# Patient Record
Sex: MALE | Race: WHITE | NOT HISPANIC OR LATINO | Employment: FULL TIME | URBAN - METROPOLITAN AREA
[De-identification: names, ages, dates, MRNs, and addresses within clinical notes are randomized per-mention and may not be internally consistent; named-entity substitution may affect disease eponyms.]

---

## 2021-11-04 ENCOUNTER — APPOINTMENT (OUTPATIENT)
Dept: URGENT CARE | Facility: CLINIC | Age: 56
End: 2021-11-04

## 2021-11-04 DIAGNOSIS — Z00.8 ENCOUNTER FOR BIOMETRIC SCREENING: ICD-10-CM

## 2021-11-04 LAB
ANION GAP SERPL CALCULATED.3IONS-SCNC: 1 MMOL/L (ref 4–13)
BUN SERPL-MCNC: 19 MG/DL (ref 5–25)
CALCIUM SERPL-MCNC: 9.1 MG/DL (ref 8.3–10.1)
CHLORIDE SERPL-SCNC: 110 MMOL/L (ref 100–108)
CHOLEST SERPL-MCNC: 209 MG/DL (ref 50–200)
CO2 SERPL-SCNC: 29 MMOL/L (ref 21–32)
CREAT SERPL-MCNC: 0.78 MG/DL (ref 0.6–1.3)
GFR SERPL CREATININE-BSD FRML MDRD: 102 ML/MIN/1.73SQ M
GLUCOSE P FAST SERPL-MCNC: 102 MG/DL (ref 65–99)
HDLC SERPL-MCNC: 53 MG/DL
LDLC SERPL CALC-MCNC: 139 MG/DL (ref 0–100)
NONHDLC SERPL-MCNC: 156 MG/DL
POTASSIUM SERPL-SCNC: 4.2 MMOL/L (ref 3.5–5.3)
SODIUM SERPL-SCNC: 140 MMOL/L (ref 136–145)
TRIGL SERPL-MCNC: 85 MG/DL

## 2021-11-04 PROCEDURE — 80061 LIPID PANEL: CPT

## 2021-11-04 PROCEDURE — 80048 BASIC METABOLIC PNL TOTAL CA: CPT

## 2024-05-15 ENCOUNTER — APPOINTMENT (OUTPATIENT)
Dept: URGENT CARE | Facility: CLINIC | Age: 59
End: 2024-05-15
Payer: OTHER MISCELLANEOUS

## 2024-05-15 PROCEDURE — 99283 EMERGENCY DEPT VISIT LOW MDM: CPT | Performed by: NURSE PRACTITIONER

## 2024-05-15 PROCEDURE — G0382 LEV 3 HOSP TYPE B ED VISIT: HCPCS | Performed by: NURSE PRACTITIONER

## 2024-05-15 RX ORDER — DOXAZOSIN MESYLATE 1 MG/1
1 TABLET ORAL
COMMUNITY
Start: 2024-01-17 | End: 2025-01-16

## 2024-05-15 RX ORDER — AMLODIPINE AND VALSARTAN 10; 320 MG/1; MG/1
1 TABLET ORAL DAILY
COMMUNITY
Start: 2024-01-17 | End: 2025-01-16

## 2024-05-15 RX ORDER — METOPROLOL TARTRATE 50 MG/1
50 TABLET, FILM COATED ORAL 2 TIMES DAILY
COMMUNITY
Start: 2024-01-17 | End: 2025-01-16

## 2024-05-30 ENCOUNTER — APPOINTMENT (OUTPATIENT)
Dept: URGENT CARE | Facility: CLINIC | Age: 59
End: 2024-05-30
Payer: OTHER MISCELLANEOUS

## 2024-05-30 PROCEDURE — 99213 OFFICE O/P EST LOW 20 MIN: CPT | Performed by: NURSE PRACTITIONER

## 2024-05-31 ENCOUNTER — OFFICE VISIT (OUTPATIENT)
Dept: OBGYN CLINIC | Facility: CLINIC | Age: 59
End: 2024-05-31
Payer: OTHER MISCELLANEOUS

## 2024-05-31 VITALS
HEART RATE: 66 BPM | DIASTOLIC BLOOD PRESSURE: 105 MMHG | HEIGHT: 68 IN | BODY MASS INDEX: 36.37 KG/M2 | SYSTOLIC BLOOD PRESSURE: 169 MMHG | WEIGHT: 240 LBS

## 2024-05-31 DIAGNOSIS — S83.241A OTHER TEAR OF MEDIAL MENISCUS, CURRENT INJURY, RIGHT KNEE, INITIAL ENCOUNTER: Primary | ICD-10-CM

## 2024-05-31 PROCEDURE — 99204 OFFICE O/P NEW MOD 45 MIN: CPT | Performed by: ORTHOPAEDIC SURGERY

## 2024-05-31 PROCEDURE — 20610 DRAIN/INJ JOINT/BURSA W/O US: CPT | Performed by: ORTHOPAEDIC SURGERY

## 2024-05-31 RX ORDER — ASPIRIN 81 MG/1
81 TABLET, CHEWABLE ORAL DAILY
COMMUNITY

## 2024-05-31 RX ORDER — HYDROCHLOROTHIAZIDE 25 MG/1
25 TABLET ORAL DAILY
COMMUNITY

## 2024-05-31 RX ORDER — VALSARTAN 40 MG/1
40 TABLET ORAL DAILY
COMMUNITY

## 2024-05-31 RX ADMIN — BUPIVACAINE HYDROCHLORIDE 4 ML: 5 INJECTION, SOLUTION EPIDURAL; INTRACAUDAL at 09:15

## 2024-05-31 RX ADMIN — KETOROLAC TROMETHAMINE 30 MG: 30 INJECTION, SOLUTION INTRAMUSCULAR; INTRAVENOUS at 09:15

## 2024-05-31 NOTE — LETTER
Caroline 3, 2024     Patient: Oumar Dow  YOB: 1965  Date of Visit: 5/31/2024      To Whom it May Concern:    Oumar Dow is under my professional care. Oumar was seen in my office on 5/31/2024. Oumar  is cleared for sedentary work only at this time. He will be seen again in 2 weeks for repeat evaluation.     If you have any questions or concerns, please don't hesitate to call.         Sincerely,          Kenneth Amaya MD

## 2024-06-03 RX ORDER — KETOROLAC TROMETHAMINE 30 MG/ML
30 INJECTION, SOLUTION INTRAMUSCULAR; INTRAVENOUS
Status: COMPLETED | OUTPATIENT
Start: 2024-05-31 | End: 2024-05-31

## 2024-06-03 RX ORDER — BUPIVACAINE HYDROCHLORIDE 5 MG/ML
4 INJECTION, SOLUTION EPIDURAL; INTRACAUDAL
Status: COMPLETED | OUTPATIENT
Start: 2024-05-31 | End: 2024-05-31

## 2024-06-03 NOTE — PROGRESS NOTES
Ortho Sports Medicine New Patient Visit     Assesment:   58 y.o. male with right acute medial meniscus tear, hypertension    Plan:    A long discussion with Oumar regarding his knee and treatment plan.  We reviewed his MRI together which does show an acute medial meniscus tear.  I do believe this is the main contributing factor to his significant pain.  We did discuss treatment for this including operative and nonoperative options.  At this point were going to start a course of nonoperative treatment.  If he is not able to see significant improvements with an injection today and physical therapy then we may consider right knee arthroscopy with partial medial meniscectomy in the future.  I did recommend that he see his primary care provider to continue to improve his control of his hypertension prior to consider any surgery.  Did perform an injection today with Toradol which was well-tolerated.  He will remain on sedentary work only at this time.  See him back in 4 weeks for repeat evaluation.        Follow up:    No follow-ups on file.        Chief Complaint   Patient presents with    Right Knee - Pain, Swelling       History of Present Illness:    The patient is a 58 y.o. male presenting with several weeks after an acute twisting injury that occurred at work.  He was sent for an MRI which does show a medial meniscus tear as well as minimal degenerative changes.  He continues to have significant pain limiting his ability to be active and continue his work duties.  Pain is all localized to the medial aspect of the joint.  He gets occasional locking sensation and swelling.  He has been using NSAIDs with minimal relief.  He does also get repetitive swelling.  He denies any significant numbness or tingling.      Knee Surgical History:  None    Past Medical, Social and Family History:  Past Medical History:   Diagnosis Date    High blood pressure      History reviewed. No pertinent surgical history.  Allergies   Allergen  Reactions    Lisinopril Hypertension and Other (See Comments)     Current Outpatient Medications on File Prior to Visit   Medication Sig Dispense Refill    amLODIPine-valsartan (EXFORGE)  MG per tablet Take 1 tablet by mouth daily      aspirin 81 mg chewable tablet Chew 81 mg daily      doxazosin (CARDURA) 1 mg tablet Take 1 mg by mouth      hydroCHLOROthiazide 25 mg tablet Take 25 mg by mouth daily      metoprolol tartrate (LOPRESSOR) 50 mg tablet Take 50 mg by mouth 2 (two) times a day      valsartan (DIOVAN) 40 mg tablet Take 40 mg by mouth daily       No current facility-administered medications on file prior to visit.     Social History     Socioeconomic History    Marital status: Unknown     Spouse name: Not on file    Number of children: Not on file    Years of education: Not on file    Highest education level: Not on file   Occupational History    Not on file   Tobacco Use    Smoking status: Never    Smokeless tobacco: Never   Substance and Sexual Activity    Alcohol use: Not Currently    Drug use: Never    Sexual activity: Yes   Other Topics Concern    Not on file   Social History Narrative    Not on file     Social Determinants of Health     Financial Resource Strain: Not on file   Food Insecurity: Not on file   Transportation Needs: Not on file   Physical Activity: Not on file   Stress: Not on file   Social Connections: Not on file   Intimate Partner Violence: Not on file   Housing Stability: Not on file         I have reviewed the past medical, surgical, social and family history, medications and allergies as documented in the EMR.    Review of systems: ROS is negative other than that noted in the HPI.  Constitutional: Negative for fatigue and fever.   HENT: Negative for sore throat.    Respiratory: Negative for shortness of breath.    Cardiovascular: Negative for chest pain.   Gastrointestinal: Negative for abdominal pain.   Endocrine: Negative for cold intolerance and heat intolerance.  "  Genitourinary: Negative for flank pain.   Musculoskeletal: Negative for back pain.   Skin: Negative for rash.   Allergic/Immunologic: Negative for immunocompromised state.   Neurological: Negative for dizziness.   Psychiatric/Behavioral: Negative for agitation.      Physical Exam:    Blood pressure (!) 169/105, pulse 66, height 5' 8\" (1.727 m), weight 109 kg (240 lb).    General/Constitutional: NAD, well developed, well nourished  HENT: Normocephalic, atraumatic  CV: Intact distal pulses, regular rate  Resp: No respiratory distress or labored breathing  Abdomen: soft, nondistended   Lymphatic: No lymphadenopathy palpated  Neuro: Alert and Oriented x 3, no focal deficits  Psych: Normal mood, normal affect  Skin: Warm, dry, no rashes, no erythema      Knee Exam:   No significant skin lesions or deformity  Range of motion from several degrees short of full extension to 125  Large degree of medial joint line tenderness   Painful Maris  Knee is stable to varus stress, valgus stress, Lachman, and posterior drawer.    EHL/FHL/TA/GS  SILT  Palpable DP pulse    Knee Imaging    X-rays of the knee reviewed and interpreted today. These show acute fractures, minimal degenerative changes.    MRI of the knee reviewed and interpreted today showing minimal degenerative changes, medial meniscus tear, no significant ligamentous injury.    Large joint arthrocentesis: R knee  Universal Protocol:  Consent: Verbal consent obtained.  Risks and benefits: risks, benefits and alternatives were discussed  Consent given by: patient  Time out: Immediately prior to procedure a \"time out\" was called to verify the correct patient, procedure, equipment, support staff and site/side marked as required.  Patient identity confirmed: verbally with patient  Supporting Documentation  Indications: pain   Procedure Details  Location: knee - R knee  Needle size: 22 G  Ultrasound guidance: no  Approach: superior  Medications administered: 4 mL bupivacaine " (PF) 0.5 %; 30 mg ketorolac 30 mg/mL    Patient tolerance: patient tolerated the procedure well with no immediate complications  Dressing:  Sterile dressing applied

## 2024-06-14 ENCOUNTER — OFFICE VISIT (OUTPATIENT)
Dept: OBGYN CLINIC | Facility: CLINIC | Age: 59
End: 2024-06-14

## 2024-06-14 VITALS
DIASTOLIC BLOOD PRESSURE: 92 MMHG | HEART RATE: 83 BPM | SYSTOLIC BLOOD PRESSURE: 162 MMHG | BODY MASS INDEX: 36.37 KG/M2 | WEIGHT: 240 LBS | HEIGHT: 68 IN

## 2024-06-14 DIAGNOSIS — I10 HYPERTENSION, UNSPECIFIED TYPE: ICD-10-CM

## 2024-06-14 DIAGNOSIS — S83.241D OTHER TEAR OF MEDIAL MENISCUS, CURRENT INJURY, RIGHT KNEE, SUBSEQUENT ENCOUNTER: Primary | ICD-10-CM

## 2024-06-14 PROCEDURE — 99213 OFFICE O/P EST LOW 20 MIN: CPT | Performed by: ORTHOPAEDIC SURGERY

## 2024-06-14 NOTE — PROGRESS NOTES
Assessment/Plan:  1. Other tear of medial meniscus, current injury, right knee, subsequent encounter        2. Hypertension, unspecified type          Scribe Attestation      I,:  John Rupert am acting as a scribe while in the presence of the attending physician.:       I,:  Kenneth Amaya MD personally performed the services described in this documentation    as scribed in my presence.:               Oumar is continuing to struggle with left knee pain due to his medial meniscus tear.  I do feel that he is an operative candidate once his hypertension is under control.  His next visit with his primary care physician is July 18, 2024.  I did offer an order for a medial  brace to help decrease his medial knee pain.  He will be fitted today.  This will likely require authorization from Workmen's Compensation.  I would like him to begin physical therapy of which he is amenable to.  He does have an upcoming trip on July 7.  I would like him to return the first week of July and we will discuss providing him a cortisone injection to decrease the inflammation and pain.  If he chooses to have a steroid injection he will not be eligible for surgery for 6 weeks from the date of the injection.    Subjective:   Oumar Dow is a 58 y.o. male who presents 4-week follow-up evaluation of the right knee with an acute medial meniscus tear.  This is a Workmen's Compensation injury.  Oumar states his knee pain remains fairly unchanged.  He continues with a complaint of medial knee pain that is exacerbated with bearing weight on the right lower extremity.  Flexing the knee can be quite painful as well.  He denies any recent episodes of instability, catching, locking or buckling.  Prolonged sitting in the car can be quite painful for him.  Oumar did see his primary care physician for hypertension.  They are adjusting his medications.  His primary care physician is not clearing him for surgery at this point.      Review of  Systems   Constitutional:  Positive for activity change. Negative for chills, fever and unexpected weight change.   HENT:  Negative for hearing loss, nosebleeds and sore throat.    Eyes:  Negative for pain, redness and visual disturbance.   Respiratory:  Negative for cough, shortness of breath and wheezing.    Cardiovascular:  Negative for chest pain, palpitations and leg swelling.   Gastrointestinal:  Negative for abdominal pain, nausea and vomiting.   Endocrine: Negative for polyphagia and polyuria.   Genitourinary:  Negative for dysuria and hematuria.   Musculoskeletal:  Positive for arthralgias, gait problem and myalgias.        See HPI   Skin:  Negative for rash and wound.   Neurological:  Negative for dizziness, numbness and headaches.   Psychiatric/Behavioral:  Negative for decreased concentration and suicidal ideas. The patient is not nervous/anxious.          Past Medical History:   Diagnosis Date    High blood pressure        History reviewed. No pertinent surgical history.    Family History   Problem Relation Age of Onset    No Known Problems Mother     No Known Problems Father     No Known Problems Sister     No Known Problems Brother        Social History     Occupational History    Not on file   Tobacco Use    Smoking status: Never    Smokeless tobacco: Never   Substance and Sexual Activity    Alcohol use: Not Currently    Drug use: Never    Sexual activity: Yes         Current Outpatient Medications:     amLODIPine-valsartan (EXFORGE)  MG per tablet, Take 1 tablet by mouth daily, Disp: , Rfl:     aspirin 81 mg chewable tablet, Chew 81 mg daily, Disp: , Rfl:     doxazosin (CARDURA) 1 mg tablet, Take 1 mg by mouth, Disp: , Rfl:     hydroCHLOROthiazide 25 mg tablet, Take 25 mg by mouth daily, Disp: , Rfl:     metoprolol tartrate (LOPRESSOR) 50 mg tablet, Take 50 mg by mouth 2 (two) times a day, Disp: , Rfl:     valsartan (DIOVAN) 40 mg tablet, Take 40 mg by mouth daily, Disp: , Rfl:     Allergies    Allergen Reactions    Lisinopril Hypertension and Other (See Comments)       Objective:  Vitals:    06/14/24 1000   BP: 162/92   Pulse: 83       Ortho Exam  Knee Exam:   No significant skin lesions or deformity  Range of motion from several degrees short of full extension to 125  Large degree of medial joint line tenderness   Painful Maris  Knee is stable to varus stress, valgus stress, Lachman, and posterior drawer.    EHL/FHL/TA/GS  SILT    Physical Exam  Vitals reviewed.   Constitutional:       Appearance: He is well-developed.   HENT:      Head: Normocephalic and atraumatic.   Eyes:      General:         Right eye: No discharge.         Left eye: No discharge.      Conjunctiva/sclera: Conjunctivae normal.   Cardiovascular:      Rate and Rhythm: Regular rhythm.   Pulmonary:      Effort: Pulmonary effort is normal. No respiratory distress.   Musculoskeletal:      Cervical back: Normal range of motion and neck supple.      Comments: As noted in the HPI.   Skin:     General: Skin is warm and dry.   Neurological:      Mental Status: He is alert and oriented to person, place, and time.   Psychiatric:         Behavior: Behavior normal.               This document was created using speech voice recognition software.   Grammatical errors, random word insertions, pronoun errors, and incomplete sentences are an occasional consequence of this system due to software limitations, ambient noise, and hardware issues.   Any formal questions or concerns about content, text, or information contained within the body of this dictation should be directly addressed to the provider for clarification.

## 2024-06-18 ENCOUNTER — TELEPHONE (OUTPATIENT)
Age: 59
End: 2024-06-18

## 2024-06-18 NOTE — TELEPHONE ENCOUNTER
Caller: Stephen    Doctor/Office: Jose Luis MAO#: [phone]      What needs to be faxed: OVN 6/14  Can WC also get an updated work status and fax it as well  ATTN to: claim number [id number]    Fax#: [phone]      Documents were successfully e-faxed

## 2024-06-21 NOTE — TELEPHONE ENCOUNTER
Caller: [employer]-    Doctor/Office: Jose Luis    CB#:        What needs to be faxed: OVN for 6/14/24    ATTN to: [employer] Claim# [id number]     Fax#: [phone]      Documents were successfully e-faxed---on 6/21/24

## 2024-06-25 NOTE — PROGRESS NOTES
PT Evaluation   Today's date: 2024  Patient name: Oumar Dow  : 1965  MRN: [id number]  Referring provider: Kenneth Amaya*  Dx:   Encounter Diagnosis     ICD-10-CM    1. Tear of medial meniscus of right knee, current, unspecified tear type, subsequent encounter  S83.241D Ambulatory Referral to Physical Therapy            Assessment  Assessment details: Patient is a 58 y.o. Male who presents with referring diagnosis of meniscus tear of R knee. Patient's greatest concern is the pain he is experiencing and fear of not being able to keep active.    Primary movement impairment diagnosis of hypomobility resulting in pathoanatomical symptoms of pain, decreased ROM, proprioception, power production, and function. The aforementioned impairments have limited the patient's ability to transfer, mobilize, tolerate activity, prolonged positions, and perform ADLs. No further referral appears necessary at this time based upon examination results    Patient education performed during today's session included: POC, prognosis    Primary movement impairments:  1) Hypomobility      Impairments: Abnormal or restricted ROM, Activity intolerance, Impaired physical strength, Lacks appropriate HEP, Poor posture, Poor body mechanics, and Pain with function  Understanding of Dx/Px/POC: Good  Prognosis: Good    Patient verbalized understanding of POC.         Please contact me if you have any questions or recommendations. Thank you for the referral and the opportunity to share in Oumar Dow's care.        Plan  Patient would benefit from: Skilled PT  Planned modality interventions: Biofeedback  Planned therapy interventions: Abdominal trunk stabilization, Balance/WB training, Body mechanics training, Dry Needling, Functional ROM exercises, HEP, Joint mobilization, Manual therapy, Motor coordination training, Neuromuscular re-education, Patient  education, Postural training, Strengthening, Stretching, Therapeutic activities, and Therapeutic exercises  Frequency: 2x/week  Duration in weeks: 6-8 weeks  Plan of Care beginning date: 24  Plan of Care expiration date: 8 weeks - 2024  Treatment plan discussed with: Patient       Goals  Short Term Goals (1-3 weeks):    - Patient will be independent in basic HEP 2-3 weeks  - Patient will report >50% reduction in pain  - Patient will demonstrate >1/3 improvement in MMT grade as applicable  - Patient will be able to ambulate w/o greater than 4/10 pain w/ LRDN    Long Term Goals (6-8 weeks):  - Patient will be independent in a comprehensive home exercise program  - Patient FOTO score will improve to beyond goal score  - Patient will self-report >80% improvement in function  - Patient will be able to return to ADLs w/o limitation  - Pt will be able to return to work w/o limitations      Subjective    History of Present Illness  - Mechanism of injury: Pt presents w/ primary complaints of R knee that occurred on April 15, 2025. Pt was turning when he was talking to a customer and he felt a pop. He states that when he drove home, his knee locked up. He filed about his injury but his complaint was mishandled and lost for approx. 5 weeks. He is having significant difficulty w/ prolonged positions, transferring, and stairs. He reports minimal difficulty w/ walking and standing. He reports significant popping, locking, clicking, and buckling. Walking helps his pain. Pt is experiencing significant R knee pain as well after favoring his L side. Current presents w/ a knee brace from his doctor.   - Primary AD: n/a  - Assist level at home: Independent  - Prior level of function: Independent      Pain  - Current pain ratin/10  - At best pain ratin/10  - At worst pain rating: 10/10  - Location: Medial aspect of knee  - Aggravating factors: Driving, sitting, transferring,     Objective     Red Flag Screening  -  Positive for: age >50 years old    - Negative for: history of cancer, fever, chills, night sweats, weight loss, recent infection, immunosuppression, rest/night pain, saddle anesthesia, bladder dysfunction, and LE neurological deficits      Sensation  - Light touch: Intact and symmetrical L1-S2    LE MMT  LEFT   RIGHT  -Hip Flexion:   3+/5 P!  3+/5 P!    -Knee Flexion  4-/5 P!  4-/5 P!  -Knee Extension 4-/5 P!  4-/5 P!    -Ankle DF  5/5  5/5  -Ankle PF  5/5  5/5        Hip Range of Motion    LEFT  RIGHT  - Flexion WNL  WNL    Knee Range of Motion    LEFT  RIGHT  - Flexion WNL  WNL  - Extension WNL  WNL     Ankle Range of Motion    LEFT  RIGHT  - DF  WNL  WNL  - PF  WNL  WNL      Diagnostic Tests Performed  LEFT  Positive bounce home test b/l    Functional Assessment  -Functional Squat: Valgus  -Gait Assessment: Antalgic gait, decreased stride length                 Insurance:  A/CMS Eval/ Re-eval POC expires FOTO Auth #/ Referral # Total units  Start date  Expiration date Extension  Visit limitation?  PT only or  PT+OT? Co-Insurance   AMA 6/27/24 8/22/24  No auth req                                                                         Date               Units:  Used               Authed:  Remaining                     Date               Units:  Used               Authed:  Remaining                      Date 6/27/24        Visit Number IE        Manual                                             Neuro Re-Ed         Bridges 1x10        Squat 1x10                                                                                TherEx                                                                        TherAct         Patient education                                             Gait Training                                    Modalities         CP               Precautions: HBP  Past Medical History:   Diagnosis Date    High blood pressure

## 2024-06-27 ENCOUNTER — EVALUATION (OUTPATIENT)
Dept: PHYSICAL THERAPY | Facility: CLINIC | Age: 59
End: 2024-06-27
Payer: OTHER MISCELLANEOUS

## 2024-06-27 DIAGNOSIS — S83.241D TEAR OF MEDIAL MENISCUS OF RIGHT KNEE, CURRENT, UNSPECIFIED TEAR TYPE, SUBSEQUENT ENCOUNTER: ICD-10-CM

## 2024-06-27 PROCEDURE — 97161 PT EVAL LOW COMPLEX 20 MIN: CPT

## 2024-06-27 PROCEDURE — 97112 NEUROMUSCULAR REEDUCATION: CPT

## 2024-06-28 ENCOUNTER — OFFICE VISIT (OUTPATIENT)
Dept: OBGYN CLINIC | Facility: CLINIC | Age: 59
End: 2024-06-28

## 2024-06-28 VITALS
WEIGHT: 240 LBS | SYSTOLIC BLOOD PRESSURE: 148 MMHG | BODY MASS INDEX: 36.37 KG/M2 | HEIGHT: 68 IN | HEART RATE: 76 BPM | DIASTOLIC BLOOD PRESSURE: 90 MMHG

## 2024-06-28 DIAGNOSIS — S83.241D OTHER TEAR OF MEDIAL MENISCUS, CURRENT INJURY, RIGHT KNEE, SUBSEQUENT ENCOUNTER: Primary | ICD-10-CM

## 2024-06-28 PROCEDURE — 99213 OFFICE O/P EST LOW 20 MIN: CPT | Performed by: ORTHOPAEDIC SURGERY

## 2024-06-28 PROCEDURE — 20610 DRAIN/INJ JOINT/BURSA W/O US: CPT | Performed by: ORTHOPAEDIC SURGERY

## 2024-06-28 RX ORDER — TRIAMCINOLONE ACETONIDE 40 MG/ML
40 INJECTION, SUSPENSION INTRA-ARTICULAR; INTRAMUSCULAR
Status: COMPLETED | OUTPATIENT
Start: 2024-06-28 | End: 2024-06-28

## 2024-06-28 RX ORDER — BUPIVACAINE HYDROCHLORIDE 5 MG/ML
4 INJECTION, SOLUTION EPIDURAL; INTRACAUDAL
Status: COMPLETED | OUTPATIENT
Start: 2024-06-28 | End: 2024-06-28

## 2024-06-28 RX ADMIN — BUPIVACAINE HYDROCHLORIDE 4 ML: 5 INJECTION, SOLUTION EPIDURAL; INTRACAUDAL at 08:15

## 2024-06-28 RX ADMIN — TRIAMCINOLONE ACETONIDE 40 MG: 40 INJECTION, SUSPENSION INTRA-ARTICULAR; INTRAMUSCULAR at 08:15

## 2024-06-28 NOTE — TELEPHONE ENCOUNTER
Caller: [employer]    Doctor: Jose Luis    Reason for call: Request work status note. Faxed to [phone] claim#[id number]    Call back#: [phone]

## 2024-06-28 NOTE — LETTER
June 28, 2024     Patient: Oumar Dow  YOB: 1965  Date of Visit: 6/28/2024      To Whom it May Concern:    Oumar Dow is under my professional care. Oumar was seen in my office on 6/28/2024. Oumar is cleared for sedentary work only at this time. He will be seen back in 3-4 weeks to assess his blood pressure and determine whether he is able to undergo surgery at that time vs return to work if the injection provides adequate relief.     If you have any questions or concerns, please don't hesitate to call.         Sincerely,          Kenneth Amaya MD

## 2024-06-28 NOTE — PROGRESS NOTES
"Assessment/Plan:  1. Other tear of medial meniscus, current injury, right knee, subsequent encounter            Since last visit the medial  brace has improved her symptoms.  However he continues to have significant pain along the medial joint line.  This is limiting his ability to stand for any prolonged periods.  He gets significant pain whenever he is ambulating or changing directions.  He does have occasional locking as well.  We still did discuss that surgical intervention may be his best option at this point.  However his recently on new medications for his hypertension.  It is better controlled today which is a good sign.  He is can to follow-up with his primary care provider in several weeks for repeat evaluation and discussion of that issue.  If it is deemed to be well-controlled at that time on the new medication he may be a surgical candidate and I believe this would provide him more lasting relief.  In the meantime I did recommend a corticosteroid injection today.  This was performed and well-tolerated.  I explained that if this provide significant long-lasting relief he may not need surgery however I cannot predict how long we will last we will have to reevaluate at next visit.  He is can to follow-up with me shortly after the visit with his primary care doctor to continue to discuss this.  In the meantime I recommended continued light duty or sedentary work only with his job.  I provided a note for this today.    Large joint arthrocentesis: R knee  Universal Protocol:  Consent: Verbal consent obtained.  Risks and benefits: risks, benefits and alternatives were discussed  Consent given by: patient  Time out: Immediately prior to procedure a \"time out\" was called to verify the correct patient, procedure, equipment, support staff and site/side marked as required.  Patient identity confirmed: verbally with patient  Supporting Documentation  Indications: pain   Procedure Details  Location: knee - R " knee  Needle size: 22 G  Ultrasound guidance: no  Approach: superior  Medications administered: 4 mL bupivacaine (PF) 0.5 %; 40 mg triamcinolone acetonide 40 mg/mL    Patient tolerance: patient tolerated the procedure well with no immediate complications  Dressing:  Sterile dressing applied            Subjective:   Oumar Dow is a 58 y.o. male who returns for evaluation of his right knee.  Medial limb brace is helping.  Continues to have pain in locking on occasion.  It is all localized to the medial joint line where the meniscus tear is.  He is interested in a corticosteroid injection today.  Overall his blood pressure is improving with the new medication.      Review of Systems   Constitutional:  Positive for activity change. Negative for chills, fever and unexpected weight change.   HENT:  Negative for hearing loss, nosebleeds and sore throat.    Eyes:  Negative for pain, redness and visual disturbance.   Respiratory:  Negative for cough, shortness of breath and wheezing.    Cardiovascular:  Negative for chest pain, palpitations and leg swelling.   Gastrointestinal:  Negative for abdominal pain, nausea and vomiting.   Endocrine: Negative for polyphagia and polyuria.   Genitourinary:  Negative for dysuria and hematuria.   Musculoskeletal:  Positive for arthralgias, gait problem and myalgias.        See HPI   Skin:  Negative for rash and wound.   Neurological:  Negative for dizziness, numbness and headaches.   Psychiatric/Behavioral:  Negative for decreased concentration and suicidal ideas. The patient is not nervous/anxious.          Past Medical History:   Diagnosis Date    High blood pressure        History reviewed. No pertinent surgical history.    Family History   Problem Relation Age of Onset    No Known Problems Mother     No Known Problems Father     No Known Problems Sister     No Known Problems Brother        Social History     Occupational History    Not on file   Tobacco Use    Smoking status: Never     Smokeless tobacco: Never   Vaping Use    Vaping status: Never Used   Substance and Sexual Activity    Alcohol use: Not Currently    Drug use: Never    Sexual activity: Yes         Current Outpatient Medications:     amLODIPine-valsartan (EXFORGE)  MG per tablet, Take 1 tablet by mouth daily, Disp: , Rfl:     aspirin 81 mg chewable tablet, Chew 81 mg daily, Disp: , Rfl:     doxazosin (CARDURA) 1 mg tablet, Take 1 mg by mouth, Disp: , Rfl:     hydroCHLOROthiazide 25 mg tablet, Take 25 mg by mouth daily, Disp: , Rfl:     metoprolol tartrate (LOPRESSOR) 50 mg tablet, Take 50 mg by mouth 2 (two) times a day, Disp: , Rfl:     valsartan (DIOVAN) 40 mg tablet, Take 40 mg by mouth daily, Disp: , Rfl:     Allergies   Allergen Reactions    Lisinopril Hypertension and Other (See Comments)       Objective:  Vitals:    06/28/24 0822   BP: 148/90   Pulse: 76       Ortho Exam  Knee Exam:   No significant skin lesions or deformity  Range of motion from several degrees short of full extension to 125  Large degree of medial joint line tenderness   Painful Maris  Knee is stable to varus stress, valgus stress, Lachman, and posterior drawer.    EHL/FHL/TA/GS  SILT    Physical Exam  Vitals reviewed.   Constitutional:       Appearance: He is well-developed.   HENT:      Head: Normocephalic and atraumatic.   Eyes:      General:         Right eye: No discharge.         Left eye: No discharge.      Conjunctiva/sclera: Conjunctivae normal.   Cardiovascular:      Rate and Rhythm: Regular rhythm.   Pulmonary:      Effort: Pulmonary effort is normal. No respiratory distress.   Musculoskeletal:      Cervical back: Normal range of motion and neck supple.      Comments: As noted in the HPI.   Skin:     General: Skin is warm and dry.   Neurological:      Mental Status: He is alert and oriented to person, place, and time.   Psychiatric:         Behavior: Behavior normal.               This document was created using speech voice  recognition software.   Grammatical errors, random word insertions, pronoun errors, and incomplete sentences are an occasional consequence of this system due to software limitations, ambient noise, and hardware issues.   Any formal questions or concerns about content, text, or information contained within the body of this dictation should be directly addressed to the provider for clarification.

## 2024-07-02 ENCOUNTER — OFFICE VISIT (OUTPATIENT)
Dept: PHYSICAL THERAPY | Facility: CLINIC | Age: 59
End: 2024-07-02
Payer: OTHER MISCELLANEOUS

## 2024-07-02 DIAGNOSIS — S83.241D TEAR OF MEDIAL MENISCUS OF RIGHT KNEE, CURRENT, UNSPECIFIED TEAR TYPE, SUBSEQUENT ENCOUNTER: Primary | ICD-10-CM

## 2024-07-02 PROCEDURE — 97110 THERAPEUTIC EXERCISES: CPT

## 2024-07-02 PROCEDURE — 97112 NEUROMUSCULAR REEDUCATION: CPT

## 2024-07-02 PROCEDURE — 97140 MANUAL THERAPY 1/> REGIONS: CPT

## 2024-07-02 NOTE — PROGRESS NOTES
"Daily Note     Today's date: 2024  Patient name: Oumar Dow  : 1965  MRN: [id number]  Referring provider: Kenneth Amaya*  Dx:   Encounter Diagnosis     ICD-10-CM    1. Tear of medial meniscus of right knee, current, unspecified tear type, subsequent encounter  S83.241D           Start Time: 1100  Stop Time: 1145  Total time in clinic (min): 45 minutes    Subjective: Pt reports feeling okay. He hurt his hamstring over the weekend but is unsure how.       Objective: See treatment diary below      Assessment: Tolerated treatment fairly. His hamstring and knee pain have affected how much activity he can tolerate. He responded well to STM to his HS which reduced his pain. Distraction also helped reduce his knee pain. He had discomfort when squatting which did not reduce w/ cueing. Continue progressing pt as he can tolerate regarding strength, motor control, and function. Pt Patient demonstrated fatigue post treatment, exhibited good technique with therapeutic exercises, and would benefit from continued PT      Plan: Continue per plan of care.      Insurance:  AMA/CMS Eval/ Re-eval POC expires FOTO Auth #/ Referral # Total units  Start date  Expiration date Extension  Visit limitation?  PT only or  PT+OT? Co-Insurance   AMA 24  No auth req                                                                         Date               Units:  Used               Authed:  Remaining                     Date               Units:  Used               Authed:  Remaining                      Date 24       Visit Number IE 2       Manual         Tibiofemoral dist  WM       STM  HS WM                         Neuro Re-Ed         Bridges 1x10 2x12 3\" w/ belt       Squat 1x10 2x12 TRX       Clamshells  RTB 2x10 3\"       Lateral step down         Lateral slides  15' 3 laps RTB                                                    TherEx         Anterior step down  6\" 2x10       LAQ  2x10 ea     "                                                TherAct         Patient education                                             Gait Training                                    Modalities         CP               Precautions: HBP  Past Medical History:   Diagnosis Date    High blood pressure

## 2024-07-05 ENCOUNTER — OFFICE VISIT (OUTPATIENT)
Dept: PHYSICAL THERAPY | Facility: CLINIC | Age: 59
End: 2024-07-05
Payer: OTHER MISCELLANEOUS

## 2024-07-05 DIAGNOSIS — S83.241D TEAR OF MEDIAL MENISCUS OF RIGHT KNEE, CURRENT, UNSPECIFIED TEAR TYPE, SUBSEQUENT ENCOUNTER: Primary | ICD-10-CM

## 2024-07-05 PROCEDURE — 97112 NEUROMUSCULAR REEDUCATION: CPT

## 2024-07-05 PROCEDURE — 97110 THERAPEUTIC EXERCISES: CPT

## 2024-07-05 NOTE — PROGRESS NOTES
"Daily Note     Today's date: 2024  Patient name: Oumar Dow  : 1965  MRN: [id number]  Referring provider: Kenneth Amaya*  Dx:   Encounter Diagnosis     ICD-10-CM    1. Tear of medial meniscus of right knee, current, unspecified tear type, subsequent encounter  S83.241D             Start Time: 1145  Stop Time: 1230  Total time in clinic (min): 45 minutes    Subjective: Pt reports feeling okay. He states his hamstring is feeling better.       Objective: See treatment diary below      Assessment: Tolerated treatment well. He is demoing improvements in his strength and motor control. His pain levels seem to be decreasing w/ his activities and exercises. He demo's some motor control deficits during lateral step downs. This seems to be due to a compensatory pattern he developed due to his pain. Corrected w/ VC. Continue progressing pt as he can tolerate regarding strength, motor control, and function. Pt Patient demonstrated fatigue post treatment, exhibited good technique with therapeutic exercises, and would benefit from continued PT      Plan: Continue per plan of care.      Insurance:  AMA/CMS Eval/ Re-eval POC expires FOTO Auth #/ Referral # Total units  Start date  Expiration date Extension  Visit limitation?  PT only or  PT+OT? Co-Insurance   AMA 24  No auth req                                                                         Date               Units:  Used               Authed:  Remaining                     Date               Units:  Used               Authed:  Remaining                      Date 24      Visit Number IE 2 3      Manual         Tibiofemoral dist  WM WM      STM  HS WM                         Neuro Re-Ed         Bridges 1x10 2x12 3\" w/ belt 2x12      Squat 1x10 2x12 TRX 2x15 TRX      Clamshells  RTB 2x10 3\" Black TB 2x12 5\"      Lateral step down   2x12 6\"      Lateral slides  15' 3 laps RTB 15' 3 laps RTB                               " "                    TherEx         Anterior step down  6\" 2x10 6\" 2x12      LAQ  2x10 ea 2x10      Repeated knee ext   1x10 P!                                          TherAct         Patient education                                             Gait Training                                    Modalities         CP               Precautions: HBP  Past Medical History:   Diagnosis Date    High blood pressure                "

## 2024-07-16 ENCOUNTER — APPOINTMENT (OUTPATIENT)
Dept: PHYSICAL THERAPY | Facility: CLINIC | Age: 59
End: 2024-07-16
Payer: OTHER MISCELLANEOUS

## 2024-07-25 ENCOUNTER — PREP FOR PROCEDURE (OUTPATIENT)
Dept: OBGYN CLINIC | Facility: CLINIC | Age: 59
End: 2024-07-25

## 2024-07-25 ENCOUNTER — OFFICE VISIT (OUTPATIENT)
Dept: OBGYN CLINIC | Facility: CLINIC | Age: 59
End: 2024-07-25
Payer: OTHER MISCELLANEOUS

## 2024-07-25 VITALS
DIASTOLIC BLOOD PRESSURE: 98 MMHG | SYSTOLIC BLOOD PRESSURE: 167 MMHG | WEIGHT: 240 LBS | BODY MASS INDEX: 36.37 KG/M2 | HEIGHT: 68 IN | HEART RATE: 98 BPM

## 2024-07-25 DIAGNOSIS — S83.241A OTHER TEAR OF MEDIAL MENISCUS, CURRENT INJURY, RIGHT KNEE, INITIAL ENCOUNTER: Primary | ICD-10-CM

## 2024-07-25 PROCEDURE — 99214 OFFICE O/P EST MOD 30 MIN: CPT | Performed by: ORTHOPAEDIC SURGERY

## 2024-07-25 RX ORDER — CHLORHEXIDINE GLUCONATE ORAL RINSE 1.2 MG/ML
15 SOLUTION DENTAL ONCE
OUTPATIENT
Start: 2024-07-25 | End: 2024-07-25

## 2024-07-25 RX ORDER — CEFAZOLIN SODIUM 2 G/50ML
2000 SOLUTION INTRAVENOUS ONCE
OUTPATIENT
Start: 2024-07-25 | End: 2024-07-25

## 2024-07-25 NOTE — LETTER
July 26, 2024     Patient: Oumar Dow  YOB: 1965  Date of Visit: 7/25/2024      To Whom it May Concern:    Oumar Dow is under my professional care. Oumar was seen in my office on 7/25/2024. Oumar has a knee injury that will require surgery on 8/21/24. He is cleared to continue sedentary work only at this time. I anticipate return to full duty 4-6 weeks after surgical date. Additional timelines will be provided pending findings at the time of surgery.     If you have any questions or concerns, please don't hesitate to call.         Sincerely,          Kenneth Amaya MD        CC: No Recipients

## 2024-07-25 NOTE — PROGRESS NOTES
Assessment/Plan:  1. Other tear of medial meniscus, current injury, right knee, initial encounter  Case request operating room: Knee Arthroscopy, partial medial meniscectomy    CBC and differential    Inpatient consult to Internal Medicine    Case request operating room: Knee Arthroscopy, partial medial meniscectomy          We had a long discussion regarding the diagnosis and treatment plan for Oumar's right medial meniscus tear. We discussed options for operative and nonoperative treatment. Specifically, nonoperative treatment options would include additional PT, bracing, OTC medications as needed, and consideration of steroid versus Visco injections in the future. Because Oumar has persistent, debilitating symptoms after failing various non-operative treatments with a medial meniscus tear noted on MRI, I recommended surgical intervention with a right knee arthroscopy and partial medial menisectomy. We had a detailed discussion of the risks, benefits, and alternatives to this procedure. The risks include but are not limited to infection, bleeding, stiffness, loss of range of motion, blood clot, failure of surgery, fracture, risk of potential future arthritis, swelling, injury to surrounding structures/nerve/artery/vein, failure of medical implants or surgical instruments, retained hardware and/or foreign body, and continued pain/dysfunction/disability. We discussed the expected timeline for recovery including the timeline for return to work and sporting activities. The patient expressed good understanding and elected to proceed. They will meet be scheduled at a mutually convenient time in the near future.     Given the patient's recent COVID-19 diagnosis on 7/15/24, we will schedule him at appropriate time since positive test to limit additional risk.  Additionally, I recommend waiting 12 weeks from his most recent steroid injection on 6/28/24 to limit risks associated with having surgery following an injection in  "this timeframe. The patient will also undergo lab work, EKG, and pre-op clearance from his PCP prior to surgery.     We will plan to start physical therapy 1-3 days after surgery per protocol provided on the day of surgery. We discussed bringing his crutches and medial  brace on the day of surgery. In the meantime, he can continue to wear the brace as needed for added support/stability. He can use ice/heat and OTC medications as needed for pain.      Follow up 10-14 days after surgery for first post-op visit.           Subjective:   Oumar Dow is a 58 y.o. male who returns for evaluation of his right knee in the setting of known medial meniscus tear and minimal degenerative changes. The patient notes constant, increased pain over the past 2 weeks, which he states is \"as bad as it's been\". He also notes weakness, especially with stair climbing, and instability with walking. The patient denies new injury/trauma, joint effusion, locking episodes, and numbness/tingling. He continues to wear the medial  brace, which does provide adequate support/stability. The patient denies adequate pain relief from the steroid injection performed on 6/28/24.     The patient is currently working on hypertension management with his PCP. The patient was diagnosed with COVID-19 on 7/15/24.      Review of Systems   Constitutional:  Positive for activity change. Negative for chills, fever and unexpected weight change.   HENT:  Negative for hearing loss, nosebleeds and sore throat.    Eyes:  Negative for pain, redness and visual disturbance.   Respiratory:  Negative for cough, shortness of breath and wheezing.    Cardiovascular:  Negative for chest pain, palpitations and leg swelling.   Gastrointestinal:  Negative for abdominal pain, nausea and vomiting.   Endocrine: Negative for polyphagia and polyuria.   Genitourinary:  Negative for dysuria and hematuria.   Musculoskeletal:  Positive for arthralgias, gait problem and " myalgias.        See HPI   Skin:  Negative for rash and wound.   Neurological:  Negative for dizziness, numbness and headaches.   Psychiatric/Behavioral:  Negative for decreased concentration and suicidal ideas. The patient is not nervous/anxious.          Past Medical History:   Diagnosis Date    High blood pressure        History reviewed. No pertinent surgical history.    Family History   Problem Relation Age of Onset    No Known Problems Mother     No Known Problems Father     No Known Problems Sister     No Known Problems Brother        Social History     Occupational History    Not on file   Tobacco Use    Smoking status: Never    Smokeless tobacco: Never   Vaping Use    Vaping status: Never Used   Substance and Sexual Activity    Alcohol use: Not Currently    Drug use: Never    Sexual activity: Yes         Current Outpatient Medications:     amLODIPine-valsartan (EXFORGE)  MG per tablet, Take 1 tablet by mouth daily, Disp: , Rfl:     aspirin 81 mg chewable tablet, Chew 81 mg daily, Disp: , Rfl:     doxazosin (CARDURA) 1 mg tablet, Take 1 mg by mouth, Disp: , Rfl:     hydroCHLOROthiazide 25 mg tablet, Take 25 mg by mouth daily, Disp: , Rfl:     metoprolol tartrate (LOPRESSOR) 50 mg tablet, Take 50 mg by mouth 2 (two) times a day, Disp: , Rfl:     molnupiravir 200 mg capsule, Take 800 mg by mouth every 12 (twelve) hours, Disp: , Rfl:     valsartan (DIOVAN) 40 mg tablet, Take 40 mg by mouth daily, Disp: , Rfl:     Allergies   Allergen Reactions    Lisinopril Hypertension and Other (See Comments)       Objective:  Vitals:    07/25/24 1014   BP: 167/98   Pulse: 98       Ortho Exam  Knee Exam:   No significant skin lesions or deformity  No joint effusion  Range of motion from 0 to 125  Large degree of medial joint line tenderness   Painful Maris  Knee is stable to varus stress, valgus stress, Lachman, and posterior drawer.    EHL/FHL/TA/GS  SILT    Physical Exam  Vitals reviewed.   Constitutional:        Appearance: He is well-developed.   HENT:      Head: Normocephalic and atraumatic.   Eyes:      General:         Right eye: No discharge.         Left eye: No discharge.      Conjunctiva/sclera: Conjunctivae normal.   Cardiovascular:      Rate and Rhythm: Regular rhythm.   Pulmonary:      Effort: Pulmonary effort is normal. No respiratory distress.   Musculoskeletal:      Cervical back: Normal range of motion and neck supple.      Comments: As noted in the HPI.   Skin:     General: Skin is warm and dry.   Neurological:      Mental Status: He is alert and oriented to person, place, and time.   Psychiatric:         Behavior: Behavior normal.         MRI of the right knee shows minimal degenerative changes and medial meniscus tear.

## 2024-07-26 ENCOUNTER — TELEPHONE (OUTPATIENT)
Age: 59
End: 2024-07-26

## 2024-07-26 ENCOUNTER — OFFICE VISIT (OUTPATIENT)
Dept: PHYSICAL THERAPY | Facility: CLINIC | Age: 59
End: 2024-07-26
Payer: OTHER MISCELLANEOUS

## 2024-07-26 DIAGNOSIS — S83.241D TEAR OF MEDIAL MENISCUS OF RIGHT KNEE, CURRENT, UNSPECIFIED TEAR TYPE, SUBSEQUENT ENCOUNTER: Primary | ICD-10-CM

## 2024-07-26 PROCEDURE — 97110 THERAPEUTIC EXERCISES: CPT

## 2024-07-26 PROCEDURE — 97112 NEUROMUSCULAR REEDUCATION: CPT

## 2024-07-26 PROCEDURE — 97140 MANUAL THERAPY 1/> REGIONS: CPT

## 2024-07-26 NOTE — TELEPHONE ENCOUNTER
Caller: Ilene    Doctor/Office: Jose Luis Herring     CB#: [phone]    What needs to be faxed: Last office note faxed     ATTN to: Claim Number [id number]    Fax#: [phone]    Documents were successfully e-faxed 7/26/2024

## 2024-07-26 NOTE — PROGRESS NOTES
"Daily Note     Today's date: 2024  Patient name: Oumar Dow  : 1965  MRN: [id number]  Referring provider: Kenneth Amaya*  Dx:   Encounter Diagnosis     ICD-10-CM    1. Tear of medial meniscus of right knee, current, unspecified tear type, subsequent encounter  S83.241D             Start Time: 0845  Stop Time: 930  Total time in clinic (min): 45 minutes    Subjective: Pt reports having pain 2 weeks ago that has not gone down.       Objective: See treatment diary below      Assessment: Tolerated treatment well. Pt continues to have significant pain today that was largely unchanged today following force progression and manuals. Pain was briefly reduced following but returned after bearing weight for a minute. Pt experienced pain in his L knee as well today which was relieved following force progression. Pt tolerated TKE's w/o much pain. Will continue to manage symptoms until his surgery. Continue progressing pt as he can tolerate regarding strength, motor control, and function. Pt Patient demonstrated fatigue post treatment, exhibited good technique with therapeutic exercises, and would benefit from continued PT      Plan: Continue per plan of care.      Insurance:  AMA/CMS Eval/ Re-eval POC expires FOTO Auth #/ Referral # Total units  Start date  Expiration date Extension  Visit limitation?  PT only or  PT+OT? Co-Insurance   AMA 24  No auth req                                                                         Date               Units:  Used               Authed:  Remaining                     Date               Units:  Used               Authed:  Remaining                      Date 24     Visit Number IE 2 3 4     Manual         Tibiofemoral dist  WM WM WM     STM  HS WM                         Neuro Re-Ed         Bridges 1x10 2x12 3\" w/ belt 2x12      Squat 1x10 2x12 TRX 2x15 TRX 1x5 P!     Clamshells  RTB 2x10 3\" Black TB 2x12 5\"      Lateral " "step down   2x12 6\"      Lateral slides  15' 3 laps RTB 15' 3 laps RTB      TKE    2x15 12.5 lb                                         TherEx         Anterior step down  6\" 2x10 6\" 2x12      LAQ  2x10 ea 2x10      Repeated knee ext   1x10 P! 6x10 seated  2x10 standing  3x30  b/l                                         TherAct         Patient education                                             Gait Training                                    Modalities         CP               Precautions: HBP  Past Medical History:   Diagnosis Date    High blood pressure                "

## 2024-07-26 NOTE — TELEPHONE ENCOUNTER
Caller: [employer]    Doctor: Jose Luis    Reason for call: [employer] is calling to request a work status from 7/25 appt. Please fax to [phone]    Call back#: n/a

## 2024-07-29 ENCOUNTER — OFFICE VISIT (OUTPATIENT)
Dept: PHYSICAL THERAPY | Facility: CLINIC | Age: 59
End: 2024-07-29
Payer: OTHER MISCELLANEOUS

## 2024-07-29 DIAGNOSIS — S83.241D TEAR OF MEDIAL MENISCUS OF RIGHT KNEE, CURRENT, UNSPECIFIED TEAR TYPE, SUBSEQUENT ENCOUNTER: Primary | ICD-10-CM

## 2024-07-29 PROCEDURE — 97140 MANUAL THERAPY 1/> REGIONS: CPT

## 2024-07-29 PROCEDURE — 97110 THERAPEUTIC EXERCISES: CPT

## 2024-07-29 NOTE — PROGRESS NOTES
"Daily Note     Today's date: 2024  Patient name: Oumar Dow  : 1965  MRN: [id number]  Referring provider: Kenneth Amaya*  Dx:   Encounter Diagnosis     ICD-10-CM    1. Tear of medial meniscus of right knee, current, unspecified tear type, subsequent encounter  S83.241D             Start Time: 1100  Stop Time: 1145  Total time in clinic (min): 45 minutes    Subjective: Pt reports having pain 2 weeks ago that has not gone down.       Objective: See treatment diary below      Assessment: Tolerated treatment fairly. Pt continues to have significant pain throughout the session. Force progression helps mildly. STM seems to help reduce his pain and improve his ROM the most but changes do not last for very long. Pt was exquisitely tender along his L semitendinous tendon and R quad muscle belly. Squats aggravated his knee. Educated pt on self STM to perform at home to manage his symptoms. Will continue to manage symptoms until his surgery. Continue progressing pt as he can tolerate regarding strength, motor control, and function. Pt Patient demonstrated fatigue post treatment, exhibited good technique with therapeutic exercises, and would benefit from continued PT      Plan: Continue per plan of care.      Insurance:  AMA/CMS Eval/ Re-eval POC expires FOTO Auth #/ Referral # Total units  Start date  Expiration date Extension  Visit limitation?  PT only or  PT+OT? Co-Insurance   AMA 24  No auth req                                                                         Date               Units:  Used               Authed:  Remaining                     Date               Units:  Used               Authed:  Remaining                      Date 24    Visit Number IE 2 3 4     Manual         Tibiofemoral dist  WM WM WM     STM  HS WM   L HS and quads b/l                      Neuro Re-Ed         Bridges 1x10 2x12 3\" w/ belt 2x12      Squat 1x10 2x12 TRX 2x15 " "TRX 1x5 P! 1x10 p!    Clamshells  RTB 2x10 3\" Black TB 2x12 5\"      Lateral step down   2x12 6\"      Lateral slides  15' 3 laps RTB 15' 3 laps RTB      TKE    2x15 12.5 lb                                         TherEx         Anterior step down  6\" 2x10 6\" 2x12      LAQ  2x10 ea 2x10      Repeated knee ext   1x10 P! 6x10 seated  2x10 standing  3x30  b/l Seated 6x10 b/l    Self STM      HEP                               TherAct         Patient education                                             Gait Training                                    Modalities         CP               Precautions: HBP  Past Medical History:   Diagnosis Date    High blood pressure                "

## 2024-08-02 ENCOUNTER — APPOINTMENT (OUTPATIENT)
Dept: LAB | Facility: HOSPITAL | Age: 59
End: 2024-08-02
Attending: ORTHOPAEDIC SURGERY
Payer: OTHER MISCELLANEOUS

## 2024-08-02 ENCOUNTER — OFFICE VISIT (OUTPATIENT)
Dept: PHYSICAL THERAPY | Facility: CLINIC | Age: 59
End: 2024-08-02
Payer: OTHER MISCELLANEOUS

## 2024-08-02 DIAGNOSIS — S83.241A OTHER TEAR OF MEDIAL MENISCUS, CURRENT INJURY, RIGHT KNEE, INITIAL ENCOUNTER: ICD-10-CM

## 2024-08-02 DIAGNOSIS — S83.241D TEAR OF MEDIAL MENISCUS OF RIGHT KNEE, CURRENT, UNSPECIFIED TEAR TYPE, SUBSEQUENT ENCOUNTER: Primary | ICD-10-CM

## 2024-08-02 LAB
BASOPHILS # BLD AUTO: 0.07 THOUSANDS/ÂΜL (ref 0–0.1)
BASOPHILS NFR BLD AUTO: 1 % (ref 0–1)
EOSINOPHIL # BLD AUTO: 0.13 THOUSAND/ÂΜL (ref 0–0.61)
EOSINOPHIL NFR BLD AUTO: 2 % (ref 0–6)
ERYTHROCYTE [DISTWIDTH] IN BLOOD BY AUTOMATED COUNT: 13.6 % (ref 11.6–15.1)
HCT VFR BLD AUTO: 45.2 % (ref 36.5–49.3)
HGB BLD-MCNC: 14.4 G/DL (ref 12–17)
IMM GRANULOCYTES # BLD AUTO: 0.03 THOUSAND/UL (ref 0–0.2)
IMM GRANULOCYTES NFR BLD AUTO: 0 % (ref 0–2)
LYMPHOCYTES # BLD AUTO: 1.7 THOUSANDS/ÂΜL (ref 0.6–4.47)
LYMPHOCYTES NFR BLD AUTO: 24 % (ref 14–44)
MCH RBC QN AUTO: 27.5 PG (ref 26.8–34.3)
MCHC RBC AUTO-ENTMCNC: 31.9 G/DL (ref 31.4–37.4)
MCV RBC AUTO: 86 FL (ref 82–98)
MONOCYTES # BLD AUTO: 0.56 THOUSAND/ÂΜL (ref 0.17–1.22)
MONOCYTES NFR BLD AUTO: 8 % (ref 4–12)
NEUTROPHILS # BLD AUTO: 4.53 THOUSANDS/ÂΜL (ref 1.85–7.62)
NEUTS SEG NFR BLD AUTO: 65 % (ref 43–75)
NRBC BLD AUTO-RTO: 0 /100 WBCS
PLATELET # BLD AUTO: 293 THOUSANDS/UL (ref 149–390)
PMV BLD AUTO: 8.9 FL (ref 8.9–12.7)
RBC # BLD AUTO: 5.24 MILLION/UL (ref 3.88–5.62)
WBC # BLD AUTO: 7.02 THOUSAND/UL (ref 4.31–10.16)

## 2024-08-02 PROCEDURE — 36415 COLL VENOUS BLD VENIPUNCTURE: CPT

## 2024-08-02 PROCEDURE — 97112 NEUROMUSCULAR REEDUCATION: CPT

## 2024-08-02 PROCEDURE — 97110 THERAPEUTIC EXERCISES: CPT

## 2024-08-02 PROCEDURE — 85025 COMPLETE CBC W/AUTO DIFF WBC: CPT

## 2024-08-02 NOTE — PROGRESS NOTES
"Daily Note     Today's date: 2024  Patient name: Oumar Dow  : 1965  MRN: [id number]  Referring provider: Kenneth Amaya*  Dx:   Encounter Diagnosis     ICD-10-CM    1. Tear of medial meniscus of right knee, current, unspecified tear type, subsequent encounter  S83.241D             Start Time: 0845  Stop Time: 930  Total time in clinic (min): 45 minutes    Subjective: Pt reports his pain was significant improved today but he is fearful due to the fluctuations of his symptoms.       Objective: See treatment diary below      Assessment: Tolerated treatment well. He had no flare ups of pain during today's session and tolerated functional exercises w/o much compensatory patterns. Discussed symptom management until his surgery as his knee is highly irritable currently. Will continue to manage symptoms until his surgery. Continue progressing pt as he can tolerate regarding strength, motor control, and function. Pt Patient demonstrated fatigue post treatment, exhibited good technique with therapeutic exercises, and would benefit from continued PT      Plan: Continue per plan of care.      Insurance:  AMA/CMS Eval/ Re-eval POC expires FOTO Auth #/ Referral # Total units  Start date  Expiration date Extension  Visit limitation?  PT only or  PT+OT? Co-Insurance   AMA 24  No auth req                                                                         Date               Units:  Used               Authed:  Remaining                     Date               Units:  Used               Authed:  Remaining                      Date 24   Visit Number IE 2 3 4 5 6   Manual         Tibiofemoral dist  WM WM WM     STM  HS WM   L HS and quads b/l                      Neuro Re-Ed         Bridges 1x10 2x12 3\" w/ belt 2x12      Squat 1x10 2x12 TRX 2x15 TRX 1x5 P! 1x10 p! 2x10   Clamshells  RTB 2x10 3\" Black TB 2x12 5\"      Lateral step down   2x12 6\"    " "  Lateral slides  15' 3 laps RTB 15' 3 laps RTB      TKE    2x15 12.5 lb  2x15 12.5 lb                                       TherEx         Anterior step down  6\" 2x10 6\" 2x12   6\" 2x12   LAQ  2x10 ea 2x10      Repeated knee ext   1x10 P! 6x10 seated  2x10 standing  3x30  b/l Seated 6x10 b/l Seated 6x10 b/l   Self STM      HEP    Step up      6\" 2x12   Lateral step up      6\" 2x12            TherAct         Patient education                                             Gait Training                                    Modalities         CP               Precautions: HTN  Past Medical History:   Diagnosis Date    High blood pressure                "

## 2024-08-05 ENCOUNTER — OFFICE VISIT (OUTPATIENT)
Dept: PHYSICAL THERAPY | Facility: CLINIC | Age: 59
End: 2024-08-05
Payer: OTHER MISCELLANEOUS

## 2024-08-05 DIAGNOSIS — S83.241D TEAR OF MEDIAL MENISCUS OF RIGHT KNEE, CURRENT, UNSPECIFIED TEAR TYPE, SUBSEQUENT ENCOUNTER: Primary | ICD-10-CM

## 2024-08-05 PROCEDURE — 97112 NEUROMUSCULAR REEDUCATION: CPT

## 2024-08-05 PROCEDURE — 97110 THERAPEUTIC EXERCISES: CPT

## 2024-08-05 NOTE — PROGRESS NOTES
"Daily Note     Today's date: 2024  Patient name: Oumar Dow  : 1965  MRN: [id number]  Referring provider: Kenneth Amaya*  Dx:   Encounter Diagnosis     ICD-10-CM    1. Tear of medial meniscus of right knee, current, unspecified tear type, subsequent encounter  S83.241D               Start Time: 1500  Stop Time: 1540  Total time in clinic (min): 40 minutes    Subjective: Pt had a 20% increase in pain since his last session.       Objective: See treatment diary below      Assessment: Tolerated treatment fairly. Session was adjusted to accommodate for his pain. Had bouts of pain throughout the session. He continues to have pain with activity especially loaded flexion. Repeated knee ext briefly reduce his pain. Will continue to manage symptoms until his surgery. Continue progressing pt as he can tolerate regarding strength, motor control, and function. Pt Patient demonstrated fatigue post treatment, exhibited good technique with therapeutic exercises, and would benefit from continued PT      Plan: Continue per plan of care.      Insurance:  AMA/CMS Eval/ Re-eval POC expires FOTO Auth #/ Referral # Total units  Start date  Expiration date Extension  Visit limitation?  PT only or  PT+OT? Co-Insurance   AMA 24  No auth req                                                                         Date               Units:  Used               Authed:  Remaining                     Date               Units:  Used               Authed:  Remaining                      Date 24   Visit Number 2 3 4 5 6 7   Manual         Tibiofemoral dist WM WM WM      STM HS WM   L HS and quads b/l                       Neuro Re-Ed         Bridges 2x12 3\" w/ belt 2x12    3x15 3\"   Squat 2x12 TRX 2x15 TRX 1x5 P! 1x10 p! 2x10    Clamshells RTB 2x10 3\" Black TB 2x12 5\"    Black tb 2x15 5\"   Lateral step down  2x12 6\"    2x15 6\"   Lateral slides 15' 3 laps RTB 15' 3 laps " "RTB    15' 4 laps Black TB    TKE   2x15 12.5 lb  2x15 12.5 lb 2x15 14 lb  Ball on wall 3x15 3\"                                       TherEx         Anterior step down 6\" 2x10 6\" 2x12   6\" 2x12    LAQ 2x10 ea 2x10       Repeated knee ext  1x10 P! 6x10 seated  2x10 standing  3x30  b/l Seated 6x10 b/l Seated 6x10 b/l Seated 6x10 b/l   Self STM     HEP     Step up     6\" 2x12    Lateral step up     6\" 2x12             TherAct         Patient education                                             Gait Training                                    Modalities         CP               Precautions: HTN  Past Medical History:   Diagnosis Date    High blood pressure                "

## 2024-08-09 ENCOUNTER — OFFICE VISIT (OUTPATIENT)
Dept: PHYSICAL THERAPY | Facility: CLINIC | Age: 59
End: 2024-08-09
Payer: OTHER MISCELLANEOUS

## 2024-08-09 DIAGNOSIS — Z01.818 PRE-OP EVALUATION: Primary | ICD-10-CM

## 2024-08-09 DIAGNOSIS — S83.241D TEAR OF MEDIAL MENISCUS OF RIGHT KNEE, CURRENT, UNSPECIFIED TEAR TYPE, SUBSEQUENT ENCOUNTER: Primary | ICD-10-CM

## 2024-08-09 PROCEDURE — 97110 THERAPEUTIC EXERCISES: CPT

## 2024-08-09 PROCEDURE — 97112 NEUROMUSCULAR REEDUCATION: CPT

## 2024-08-09 NOTE — PROGRESS NOTES
Daily Note     Today's date: 2024  Patient name: Oumar Dow  : 1965  MRN: [id number]  Referring provider: Kenneth Amaya*  Dx:   Encounter Diagnosis     ICD-10-CM    1. Tear of medial meniscus of right knee, current, unspecified tear type, subsequent encounter  S83.241D                 Start Time: 1230  Stop Time: 1315  Total time in clinic (min): 45 minutes    Subjective: Pt reports having a bad day today and having heart palpitations.       Objective: See treatment diary below   BP: 150/90 mmHg   80 BPM   95% spO2      Assessment: Tolerated treatment well. Session was adjusted today to accommodate for pt's symptoms. Vitals were WNL for him. He just took his BP meds prior to the session which may have contributed to his lightheadedness. He has not had heart palpitations in a few years and stated that they usually resolve within a few minutes but this has lasted longer. Pt was educated about contacting his doctor regarding this if it does not resolve. Pt tolerated today's session w/o much flare up in pain. Continue progressing pt as he can tolerate regarding strength, motor control, and function. Pt Patient demonstrated fatigue post treatment, exhibited good technique with therapeutic exercises, and would benefit from continued PT      Plan: Continue per plan of care.      Insurance:  AMA/CMS Eval/ Re-eval POC expires FOTO Auth #/ Referral # Total units  Start date  Expiration date Extension  Visit limitation?  PT only or  PT+OT? Co-Insurance   AMA 24  No auth req                                                                         Date               Units:  Used               Authed:  Remaining                     Date               Units:  Used               Authed:  Remaining                      Date 24   Visit Number 3 4 5 6 7 8   Manual         Tibiofemoral dist WM WM       STM   L HS and quads b/l                        Neuro  "Re-Ed         Bridges 2x12    3x15 3\"    Squat 2x15 TRX 1x5 P! 1x10 p! 2x10     Clamshells Black TB 2x12 5\"    Black tb 2x15 5\"    Lateral step down 2x12 6\"    2x15 6\"    Lateral slides 15' 3 laps RTB    15' 4 laps Black TB  15' 4laps Black TB   TKE  2x15 12.5 lb  2x15 12.5 lb 2x15 14 lb  Ball on wall 3x15 3\" 3x12 14 lb                                       TherEx         Anterior step down 6\" 2x12   6\" 2x12     LAQ 2x10     3x15 3\"   Repeated knee ext 1x10 P! 6x10 seated  2x10 standing  3x30  b/l Seated 6x10 b/l Seated 6x10 b/l Seated 6x10 b/l    Self STM    HEP      Step up    6\" 2x12     Lateral step up    6\" 2x12     Bike      8' lvl 5   TherAct         Patient education                                             Gait Training                                    Modalities         CP               Precautions: HTN  Past Medical History:   Diagnosis Date    High blood pressure                "

## 2024-08-12 ENCOUNTER — OFFICE VISIT (OUTPATIENT)
Dept: PHYSICAL THERAPY | Facility: CLINIC | Age: 59
End: 2024-08-12
Payer: OTHER MISCELLANEOUS

## 2024-08-12 DIAGNOSIS — S83.241D TEAR OF MEDIAL MENISCUS OF RIGHT KNEE, CURRENT, UNSPECIFIED TEAR TYPE, SUBSEQUENT ENCOUNTER: Primary | ICD-10-CM

## 2024-08-12 PROCEDURE — 97112 NEUROMUSCULAR REEDUCATION: CPT

## 2024-08-12 PROCEDURE — 97110 THERAPEUTIC EXERCISES: CPT

## 2024-08-12 NOTE — PROGRESS NOTES
"Daily Note     Today's date: 2024  Patient name: Oumar Dow  : 1965  MRN: [id number]  Referring provider: Kenneth Amaya*  Dx:   Encounter Diagnosis     ICD-10-CM    1. Tear of medial meniscus of right knee, current, unspecified tear type, subsequent encounter  S83.241D                 Start Time: 1100  Stop Time: 1145  Total time in clinic (min): 45 minutes    Subjective: Pt reports feeling better today. His heart palpitations have resolved. Continues to have usual pain in his knee.       Objective: See treatment diary below       Assessment: Tolerated treatment well. Pt continues to have flare ups of pain throughout the session. Bike for AROM seems to reduce his initial pain. Tolerated leg press well w/o any issues. Pt tolerated today's session w/o flare ups. Continue progressing pt as he can tolerate regarding strength, motor control, and function. Pt Patient demonstrated fatigue post treatment, exhibited good technique with therapeutic exercises, and would benefit from continued PT      Plan: Continue per plan of care.      Insurance:  AMA/CMS Eval/ Re-eval POC expires FOTO Auth #/ Referral # Total units  Start date  Expiration date Extension  Visit limitation?  PT only or  PT+OT? Co-Insurance   AMA 24  No auth req                                                                         Date               Units:  Used               Authed:  Remaining                     Date               Units:  Used               Authed:  Remaining                      Date 24   Visit Number 4 5 6 7 8 9   Manual         Tibiofemoral dist WM        STM  L HS and quads b/l                         Neuro Re-Ed         Bridges    3x15 3\"  3x10 3\" belt and 25 lb kb   Squat 1x5 P! 1x10 p! 2x10   2x10 rail   Clamshells    Black tb 2x15 5\"     Lateral step down    2x15 6\"     Lateral slides    15' 4 laps Black TB  15' 4laps Black TB    TKE 2x15 12.5 lb  2x15 " "12.5 lb 2x15 14 lb  Ball on wall 3x15 3\" 3x12 14 lb 3x12 14 lb                                       TherEx         Anterior step down   6\" 2x12      LAQ     3x15 3\"    Repeated knee ext 6x10 seated  2x10 standing  3x30  b/l Seated 6x10 b/l Seated 6x10 b/l Seated 6x10 b/l     Self STM   HEP       Step up   6\" 2x12      Lateral step up   6\" 2x12      Leg press      3x10 60 lb   Bike/AROM     8' lvl 5 8' lvl 3   TherAct         Patient education                                             Gait Training                                    Modalities         CP               Precautions: HTN  Past Medical History:   Diagnosis Date    High blood pressure                "

## 2024-08-14 ENCOUNTER — TELEPHONE (OUTPATIENT)
Dept: OBGYN CLINIC | Facility: CLINIC | Age: 59
End: 2024-08-14

## 2024-08-16 ENCOUNTER — TELEPHONE (OUTPATIENT)
Dept: OBGYN CLINIC | Facility: CLINIC | Age: 59
End: 2024-08-16

## 2024-08-16 ENCOUNTER — APPOINTMENT (OUTPATIENT)
Dept: LAB | Facility: HOSPITAL | Age: 59
End: 2024-08-16
Payer: OTHER MISCELLANEOUS

## 2024-08-16 ENCOUNTER — OFFICE VISIT (OUTPATIENT)
Dept: PHYSICAL THERAPY | Facility: CLINIC | Age: 59
End: 2024-08-16
Payer: OTHER MISCELLANEOUS

## 2024-08-16 DIAGNOSIS — S83.241D TEAR OF MEDIAL MENISCUS OF RIGHT KNEE, CURRENT, UNSPECIFIED TEAR TYPE, SUBSEQUENT ENCOUNTER: Primary | ICD-10-CM

## 2024-08-16 DIAGNOSIS — Z01.818 PRE-OP EVALUATION: ICD-10-CM

## 2024-08-16 LAB
ANION GAP SERPL CALCULATED.3IONS-SCNC: 5 MMOL/L (ref 4–13)
BUN SERPL-MCNC: 17 MG/DL (ref 5–25)
CALCIUM SERPL-MCNC: 9.1 MG/DL (ref 8.4–10.2)
CHLORIDE SERPL-SCNC: 106 MMOL/L (ref 96–108)
CO2 SERPL-SCNC: 29 MMOL/L (ref 21–32)
CREAT SERPL-MCNC: 0.87 MG/DL (ref 0.6–1.3)
GFR SERPL CREATININE-BSD FRML MDRD: 95 ML/MIN/1.73SQ M
GLUCOSE SERPL-MCNC: 97 MG/DL (ref 65–140)
POTASSIUM SERPL-SCNC: 4.2 MMOL/L (ref 3.5–5.3)
SODIUM SERPL-SCNC: 140 MMOL/L (ref 135–147)

## 2024-08-16 PROCEDURE — 93005 ELECTROCARDIOGRAM TRACING: CPT

## 2024-08-16 PROCEDURE — 97110 THERAPEUTIC EXERCISES: CPT

## 2024-08-16 PROCEDURE — 80048 BASIC METABOLIC PNL TOTAL CA: CPT

## 2024-08-16 PROCEDURE — 97112 NEUROMUSCULAR REEDUCATION: CPT

## 2024-08-16 PROCEDURE — 36415 COLL VENOUS BLD VENIPUNCTURE: CPT

## 2024-08-16 NOTE — PROGRESS NOTES
"Daily Note     Today's date: 2024  Patient name: Oumar Dow  : 1965  MRN: [id number]  Referring provider: Kenneth Amaya*  Dx:   Encounter Diagnosis     ICD-10-CM    1. Tear of medial meniscus of right knee, current, unspecified tear type, subsequent encounter  S83.241D                 Start Time: 1100  Stop Time: 1145  Total time in clinic (min): 45 minutes    Subjective: Pt reports feeling okay today. He had his shingles vaccine and feels more run down today than normal. Continues to have usual pain in his knee.       Objective: See treatment diary below       Assessment: Tolerated treatment well. Pt continues to have flare ups of pain throughout the session. Tolerated squats w/ medial glides well w/ no pain during. Will continue to investigate to see if medial glides when unloaded will have carryover for other functional activities. Continue progressing pt as he can tolerate regarding strength, motor control, and function. Patient demonstrated fatigue post treatment, exhibited good technique with therapeutic exercises, and would benefit from continued PT      Plan: Continue per plan of care.      Insurance:  AMA/CMS Eval/ Re-eval POC expires FOTO Auth #/ Referral # Total units  Start date  Expiration date Extension  Visit limitation?  PT only or  PT+OT? Co-Insurance   AMA 24  No auth req                                                                         Date               Units:  Used               Authed:  Remaining                     Date               Units:  Used               Authed:  Remaining                      Date 24   Visit Number 5 6 7 8 9 10   Manual         Tibiofemoral dist         STM L HS and quads b/l                          Neuro Re-Ed         Bridges   3x15 3\"  3x10 3\" belt and 25 lb kb 3x10 3\" belt and 25 lb kb   Squat 1x10 p! 2x10   2x10 rail 3x12 rail w/ medial glide   Clamshells   Black tb 2x15 5\"    " "  Lateral step down   2x15 6\"      Lateral slides   15' 4 laps Black TB  15' 4laps Black TB     TKE  2x15 12.5 lb 2x15 14 lb  Ball on wall 3x15 3\" 3x12 14 lb 3x12 14 lb                                        TherEx         Anterior step down  6\" 2x12       LAQ    3x15 3\"     Repeated knee ext Seated 6x10 b/l Seated 6x10 b/l Seated 6x10 b/l      Self STM  HEP        Step up  6\" 2x12       Lateral step up  6\" 2x12       Leg press     3x10 60 lb 3x10 75 lb   Bike/AROM    8' lvl 5 8' lvl 3 8'   TherAct         Patient education                                             Gait Training                                    Modalities         CP               Precautions: HTN  Past Medical History:   Diagnosis Date    High blood pressure                "

## 2024-08-16 NOTE — TELEPHONE ENCOUNTER
Pt stopped in office requesting Labs and EKG and preop clearance form be sent to his PCP, he stated they can not see his labs     Pt did not complete EKG or one lab, I printed both and gave to pt to complete today.    Spoke with Adelia at Dr. Her's office who stated they can see the bloodwork, just fax EKG tracing on Monday.

## 2024-08-16 NOTE — PRE-PROCEDURE INSTRUCTIONS
Pre-Surgery Instructions:   Medication Instructions    amLODIPine-valsartan (EXFORGE)  MG per tablet Take night before surgery    aspirin 81 mg chewable tablet Instructions provided by MD    doxazosin (CARDURA) 1 mg tablet Take night before surgery    hydroCHLOROthiazide 25 mg tablet Hold day of surgery.    metoprolol tartrate (LOPRESSOR) 50 mg tablet Take day of surgery.    NON FORMULARY Stop taking 4 days prior to surgery.    valsartan (DIOVAN) 40 mg tablet Take night before surgery    Medication instructions for day surgery reviewed. Please use only a sip of water to take your instructed medications. Avoid all over the counter vitamins, supplements and NSAIDS for one week prior to surgery per anesthesia guidelines. Tylenol is ok to take as needed.     You will receive a call one business day prior to surgery with an arrival time and hospital directions. If your surgery is scheduled on a Monday, the hospital will be calling you on the Friday prior to your surgery. If you have not heard from anyone by 8pm, please call the hospital supervisor through the hospital  at [phone]. ([place] [phone] or [place] [phone]).    Do not eat or drink anything after midnight the night before your surgery, including candy, mints, lifesavers, or chewing gum. Do not drink alcohol 24hrs before your surgery. Try not to smoke at least 24hrs before your surgery.     Pt has crutching with understanding of use  Follow the pre surgery showering instructions as listed in the “My Surgical Experience Booklet” or otherwise provided by your surgeon's office. Do not use a blade to shave the surgical area 1 week before surgery. It is okay to use a clean electric clippers up to 24 hours before surgery. Do not apply any lotions, creams, including makeup, cologne, deodorant, or perfumes after showering on the day of your surgery. Do not use dry shampoo, hair spray, hair gel, or any type of hair products.     No contact  lenses, eye make-up, or artificial eyelashes. Remove nail polish, including gel polish, and any artificial, gel, or acrylic nails if possible. Remove all jewelry including rings and body piercing jewelry.     Wear causal clothing that is easy to take on and off. Consider your type of surgery.    Keep any valuables, jewelry, piercings at home. Please bring any specially ordered equipment (sling, braces) if indicated.    Arrange for a responsible person to drive you to and from the hospital on the day of your surgery. Please confirm the visitor policy for the day of your procedure when you receive your phone call with an arrival time.     Call the surgeon's office with any new illnesses, exposures, or additional questions prior to surgery.    Please reference your “My Surgical Experience Booklet” for additional information to prepare for your upcoming surgery.

## 2024-08-19 LAB
ATRIAL RATE: 68 BPM
P AXIS: 29 DEGREES
PR INTERVAL: 180 MS
QRS AXIS: 2 DEGREES
QRSD INTERVAL: 110 MS
QT INTERVAL: 390 MS
QTC INTERVAL: 414 MS
T WAVE AXIS: 10 DEGREES
VENTRICULAR RATE: 68 BPM

## 2024-08-19 PROCEDURE — 93010 ELECTROCARDIOGRAM REPORT: CPT | Performed by: INTERNAL MEDICINE

## 2024-08-20 ENCOUNTER — ANESTHESIA EVENT (OUTPATIENT)
Dept: PERIOP | Facility: HOSPITAL | Age: 59
End: 2024-08-20
Payer: OTHER MISCELLANEOUS

## 2024-08-21 ENCOUNTER — ANESTHESIA (OUTPATIENT)
Dept: PERIOP | Facility: HOSPITAL | Age: 59
End: 2024-08-21
Payer: OTHER MISCELLANEOUS

## 2024-08-21 ENCOUNTER — HOSPITAL ENCOUNTER (OUTPATIENT)
Facility: HOSPITAL | Age: 59
Setting detail: OUTPATIENT SURGERY
Discharge: HOME | End: 2024-08-21
Attending: ORTHOPAEDIC SURGERY | Admitting: ORTHOPAEDIC SURGERY
Payer: OTHER MISCELLANEOUS

## 2024-08-21 VITALS
HEIGHT: 68 IN | DIASTOLIC BLOOD PRESSURE: 84 MMHG | OXYGEN SATURATION: 96 % | SYSTOLIC BLOOD PRESSURE: 143 MMHG | TEMPERATURE: 97 F | BODY MASS INDEX: 37.13 KG/M2 | WEIGHT: 245 LBS | HEART RATE: 64 BPM | RESPIRATION RATE: 18 BRPM

## 2024-08-21 DIAGNOSIS — Z47.89 AFTERCARE FOLLOWING SURGERY OF THE MUSCULOSKELETAL SYSTEM: ICD-10-CM

## 2024-08-21 DIAGNOSIS — Z87.828 S/P ARTHROSCOPIC PARTIAL MEDIAL MENISCECTOMY OF RIGHT KNEE: Primary | ICD-10-CM

## 2024-08-21 DIAGNOSIS — Z98.890 S/P ARTHROSCOPIC PARTIAL MEDIAL MENISCECTOMY OF RIGHT KNEE: Primary | ICD-10-CM

## 2024-08-21 DIAGNOSIS — S83.241A OTHER TEAR OF MEDIAL MENISCUS, CURRENT INJURY, RIGHT KNEE, INITIAL ENCOUNTER: ICD-10-CM

## 2024-08-21 DIAGNOSIS — S83.241D OTHER TEAR OF MEDIAL MENISCUS, CURRENT INJURY, RIGHT KNEE, SUBSEQUENT ENCOUNTER: ICD-10-CM

## 2024-08-21 PROBLEM — I10 HTN (HYPERTENSION): Status: ACTIVE | Noted: 2024-08-21

## 2024-08-21 LAB
ANION GAP SERPL CALCULATED.3IONS-SCNC: 8 MMOL/L (ref 4–13)
APTT PPP: 29 SECONDS (ref 23–34)
BUN SERPL-MCNC: 19 MG/DL (ref 5–25)
CALCIUM SERPL-MCNC: 8.4 MG/DL (ref 8.4–10.2)
CHLORIDE SERPL-SCNC: 108 MMOL/L (ref 96–108)
CO2 SERPL-SCNC: 23 MMOL/L (ref 21–32)
CREAT SERPL-MCNC: 0.71 MG/DL (ref 0.6–1.3)
GFR SERPL CREATININE-BSD FRML MDRD: 103 ML/MIN/1.73SQ M
GLUCOSE P FAST SERPL-MCNC: 119 MG/DL (ref 65–99)
GLUCOSE SERPL-MCNC: 119 MG/DL (ref 65–140)
INR PPP: 0.92 (ref 0.85–1.19)
POTASSIUM SERPL-SCNC: 3.8 MMOL/L (ref 3.5–5.3)
PROTHROMBIN TIME: 12.9 SECONDS (ref 12.3–15)
SODIUM SERPL-SCNC: 139 MMOL/L (ref 135–147)

## 2024-08-21 PROCEDURE — 29881 ARTHRS KNE SRG MNISECTMY M/L: CPT

## 2024-08-21 PROCEDURE — 29881 ARTHRS KNE SRG MNISECTMY M/L: CPT | Performed by: ORTHOPAEDIC SURGERY

## 2024-08-21 PROCEDURE — NC001 PR NO CHARGE: Performed by: ORTHOPAEDIC SURGERY

## 2024-08-21 PROCEDURE — 85730 THROMBOPLASTIN TIME PARTIAL: CPT | Performed by: ORTHOPAEDIC SURGERY

## 2024-08-21 PROCEDURE — 80048 BASIC METABOLIC PNL TOTAL CA: CPT | Performed by: ORTHOPAEDIC SURGERY

## 2024-08-21 PROCEDURE — 85610 PROTHROMBIN TIME: CPT | Performed by: ORTHOPAEDIC SURGERY

## 2024-08-21 PROCEDURE — C9290 INJ, BUPIVACAINE LIPOSOME: HCPCS | Performed by: ANESTHESIOLOGY

## 2024-08-21 RX ORDER — MIDAZOLAM HYDROCHLORIDE 2 MG/2ML
INJECTION, SOLUTION INTRAMUSCULAR; INTRAVENOUS
Status: COMPLETED | OUTPATIENT
Start: 2024-08-21 | End: 2024-08-21

## 2024-08-21 RX ORDER — LIDOCAINE HYDROCHLORIDE 10 MG/ML
INJECTION, SOLUTION EPIDURAL; INFILTRATION; INTRACAUDAL; PERINEURAL AS NEEDED
Status: DISCONTINUED | OUTPATIENT
Start: 2024-08-21 | End: 2024-08-21

## 2024-08-21 RX ORDER — DEXAMETHASONE SODIUM PHOSPHATE 10 MG/ML
INJECTION, SOLUTION INTRAMUSCULAR; INTRAVENOUS AS NEEDED
Status: DISCONTINUED | OUTPATIENT
Start: 2024-08-21 | End: 2024-08-21

## 2024-08-21 RX ORDER — CEFAZOLIN SODIUM 2 G/50ML
2000 SOLUTION INTRAVENOUS ONCE
Status: COMPLETED | OUTPATIENT
Start: 2024-08-21 | End: 2024-08-21

## 2024-08-21 RX ORDER — CHLORHEXIDINE GLUCONATE ORAL RINSE 1.2 MG/ML
15 SOLUTION DENTAL ONCE
Status: COMPLETED | OUTPATIENT
Start: 2024-08-21 | End: 2024-08-21

## 2024-08-21 RX ORDER — ONDANSETRON 2 MG/ML
4 INJECTION INTRAMUSCULAR; INTRAVENOUS ONCE AS NEEDED
Status: DISCONTINUED | OUTPATIENT
Start: 2024-08-21 | End: 2024-08-21 | Stop reason: HOSPADM

## 2024-08-21 RX ORDER — FENTANYL CITRATE/PF 50 MCG/ML
50 SYRINGE (ML) INJECTION
Status: DISCONTINUED | OUTPATIENT
Start: 2024-08-21 | End: 2024-08-21 | Stop reason: HOSPADM

## 2024-08-21 RX ORDER — PROPOFOL 10 MG/ML
INJECTION, EMULSION INTRAVENOUS AS NEEDED
Status: DISCONTINUED | OUTPATIENT
Start: 2024-08-21 | End: 2024-08-21

## 2024-08-21 RX ORDER — OXYCODONE HYDROCHLORIDE 5 MG/1
5 TABLET ORAL EVERY 6 HOURS PRN
Qty: 15 TABLET | Refills: 0 | Status: SHIPPED | OUTPATIENT
Start: 2024-08-21 | End: 2024-08-31

## 2024-08-21 RX ORDER — SODIUM CHLORIDE, SODIUM LACTATE, POTASSIUM CHLORIDE, CALCIUM CHLORIDE 600; 310; 30; 20 MG/100ML; MG/100ML; MG/100ML; MG/100ML
100 INJECTION, SOLUTION INTRAVENOUS CONTINUOUS
Status: DISCONTINUED | OUTPATIENT
Start: 2024-08-21 | End: 2024-08-22 | Stop reason: HOSPADM

## 2024-08-21 RX ORDER — ACETAMINOPHEN 325 MG/1
650 TABLET ORAL EVERY 6 HOURS PRN
Status: CANCELLED | OUTPATIENT
Start: 2024-08-21

## 2024-08-21 RX ORDER — ASPIRIN 81 MG/1
81 TABLET, CHEWABLE ORAL 2 TIMES DAILY
Qty: 84 TABLET | Refills: 0 | Status: SHIPPED | OUTPATIENT
Start: 2024-08-21

## 2024-08-21 RX ORDER — OXYCODONE HYDROCHLORIDE 5 MG/1
5 TABLET ORAL EVERY 4 HOURS PRN
Status: DISCONTINUED | OUTPATIENT
Start: 2024-08-21 | End: 2024-08-22 | Stop reason: HOSPADM

## 2024-08-21 RX ORDER — ACETAMINOPHEN 500 MG
1000 TABLET ORAL EVERY 8 HOURS
Qty: 60 TABLET | Refills: 0 | Status: SHIPPED | OUTPATIENT
Start: 2024-08-21

## 2024-08-21 RX ORDER — BUPIVACAINE HYDROCHLORIDE 5 MG/ML
INJECTION, SOLUTION EPIDURAL; INTRACAUDAL
Status: COMPLETED | OUTPATIENT
Start: 2024-08-21 | End: 2024-08-21

## 2024-08-21 RX ORDER — SODIUM CHLORIDE, SODIUM LACTATE, POTASSIUM CHLORIDE, CALCIUM CHLORIDE 600; 310; 30; 20 MG/100ML; MG/100ML; MG/100ML; MG/100ML
125 INJECTION, SOLUTION INTRAVENOUS CONTINUOUS
Status: DISCONTINUED | OUTPATIENT
Start: 2024-08-21 | End: 2024-08-22 | Stop reason: HOSPADM

## 2024-08-21 RX ORDER — ONDANSETRON 2 MG/ML
INJECTION INTRAMUSCULAR; INTRAVENOUS AS NEEDED
Status: DISCONTINUED | OUTPATIENT
Start: 2024-08-21 | End: 2024-08-21

## 2024-08-21 RX ORDER — FENTANYL CITRATE 50 UG/ML
INJECTION, SOLUTION INTRAMUSCULAR; INTRAVENOUS
Status: COMPLETED | OUTPATIENT
Start: 2024-08-21 | End: 2024-08-21

## 2024-08-21 RX ORDER — ONDANSETRON 2 MG/ML
4 INJECTION INTRAMUSCULAR; INTRAVENOUS EVERY 6 HOURS PRN
Status: CANCELLED | OUTPATIENT
Start: 2024-08-21

## 2024-08-21 RX ORDER — NAPROXEN 500 MG/1
500 TABLET ORAL 2 TIMES DAILY WITH MEALS
Qty: 20 TABLET | Refills: 0 | Status: SHIPPED | OUTPATIENT
Start: 2024-08-21

## 2024-08-21 RX ADMIN — BUPIVACAINE HYDROCHLORIDE 10 ML: 5 INJECTION, SOLUTION EPIDURAL; INTRACAUDAL; PERINEURAL at 09:25

## 2024-08-21 RX ADMIN — BUPIVACAINE 10 ML: 13.3 INJECTION, SUSPENSION, LIPOSOMAL INFILTRATION at 09:25

## 2024-08-21 RX ADMIN — FENTANYL CITRATE 50 MCG: 50 INJECTION INTRAMUSCULAR; INTRAVENOUS at 11:01

## 2024-08-21 RX ADMIN — MIDAZOLAM 2 MG: 1 INJECTION INTRAMUSCULAR; INTRAVENOUS at 09:22

## 2024-08-21 RX ADMIN — FENTANYL CITRATE 50 MCG: 50 INJECTION INTRAMUSCULAR; INTRAVENOUS at 11:20

## 2024-08-21 RX ADMIN — DEXAMETHASONE SODIUM PHOSPHATE 10 MG: 10 INJECTION, SOLUTION INTRAMUSCULAR; INTRAVENOUS at 10:00

## 2024-08-21 RX ADMIN — SODIUM CHLORIDE, SODIUM LACTATE, POTASSIUM CHLORIDE, AND CALCIUM CHLORIDE 125 ML/HR: .6; .31; .03; .02 INJECTION, SOLUTION INTRAVENOUS at 08:50

## 2024-08-21 RX ADMIN — ONDANSETRON 4 MG: 2 INJECTION INTRAMUSCULAR; INTRAVENOUS at 10:00

## 2024-08-21 RX ADMIN — FENTANYL CITRATE 50 MCG: 50 INJECTION, SOLUTION INTRAMUSCULAR; INTRAVENOUS at 09:23

## 2024-08-21 RX ADMIN — LIDOCAINE HYDROCHLORIDE 50 MG: 10 INJECTION, SOLUTION EPIDURAL; INFILTRATION; INTRACAUDAL; PERINEURAL at 09:56

## 2024-08-21 RX ADMIN — PROPOFOL 200 MG: 10 INJECTION, EMULSION INTRAVENOUS at 09:56

## 2024-08-21 RX ADMIN — CHLORHEXIDINE GLUCONATE 15 ML: 1.2 RINSE ORAL at 08:50

## 2024-08-21 RX ADMIN — CEFAZOLIN SODIUM 2000 MG: 2 SOLUTION INTRAVENOUS at 09:52

## 2024-08-21 RX ADMIN — FENTANYL CITRATE 50 MCG: 50 INJECTION, SOLUTION INTRAMUSCULAR; INTRAVENOUS at 10:16

## 2024-08-21 RX ADMIN — SODIUM CHLORIDE, SODIUM LACTATE, POTASSIUM CHLORIDE, AND CALCIUM CHLORIDE 100 ML/HR: .6; .31; .03; .02 INJECTION, SOLUTION INTRAVENOUS at 11:31

## 2024-08-21 RX ADMIN — OXYCODONE HYDROCHLORIDE 5 MG: 5 TABLET ORAL at 11:37

## 2024-08-21 NOTE — ANESTHESIA POSTPROCEDURE EVALUATION
Post-Op Assessment Note    CV Status:  Stable  Pain Score: 0    Pain management: adequate       Mental Status:  Alert   Hydration Status:  Stable   PONV Controlled:  None   Airway Patency:  Patent     Post Op Vitals Reviewed: Yes    No anethesia notable event occurred.    Staff: Anesthesiologist, CRNA               BP   101/60   Temp      Pulse  70   Resp   14   SpO2   98

## 2024-08-21 NOTE — DISCHARGE INSTR - AVS FIRST PAGE
Postoperative Instructions Following Knee Surgery    MEDICATIONS:  Resume all home medications unless otherwise instructed by your surgeon.  Pain Medication:   Take Tylenol and Naproxen on prescribed schedule for 7 days  Take Oxycodone as needed for severe pain   If you were given a regional anesthetic (nerve block), it is helpful to take your pain medication before the block wear off.    Possible side effects include nausea, constipation, and urinary retention.  If you experience these side effects, please call our office for assistance.  Pain med refills are authorized only during office hours (8am-4pm, Mon-Fri).  Blood Clot Prevention:   Pump your foot up and down 20 times per hour while you are less mobile.  Ambulate with your crutches at least once every hour   Wear a adán stocking on the operative leg at all times except for hygiene for 2 weeks following surgery. Tip for smoother placement of stocking - place a plastic bag over the toes/foot then slide the stockings over the foot/ankle and remove the plastic bag. You may need another person to assist you.  Take Aspirin 81 mg twice daily for 6 weeks after surgery.    WOUND CARE:  Keep the dressing clean and dry.  Light drainage may occur the first 2 days postop.  You may remove the dressings and get the incision wet in the shower 72 hours after surgery.  DO NOT remove steri-strips or sutures.  DO NOT immerse the incision under water.  Carefully pat the incision dry.  If there is wound drainage, re-apply a fresh dry gauze dressing.  Please call our office ([phone]) if you experience either of the following:  Sudden increase in swelling, redness, or warmth at the surgical site  Excessive incisional drainage that persists beyond the 3rd day after surgery  Oral temperature greater than 101 degrees, not relieved with Tylenol  Shortness of breath, chest pain, nausea, or any other concerning symptoms    Other pain/swelling control measures:  Cold Therapy:  Apply  "ice (20 min on, 20 min off) as often as you feel is necessary. Ice helps with pain.   Elevation:  Elevate the entire leg above heart level.  Place pillows under your ankle to keep your knee straight. This will help with swelling and prevents stiffness   Compression:  Keep an ace wrap on the operative leg until you return to the office. It may be removed to shower as described above.     RANGE OF MOTION:  You are allowed FULL RANGE OF MOTION as tolerated.    IMMOBILIZATION:  None.  You are allowed full range of motion as tolerated.    ACTIVITY:   BEAR FULL WEIGHT AS TOLERATED on the operative leg.  Use crutches to assist only as needed.  Using Crutches on Stairs:  Going up, lead with your \"good\" (nonoperative) leg.  Going down, lead with your \"bad\" (operative) leg.  Use a hand rail when available.  Knee Extension:  Place a rolled towel or pillow under your ankle for 20-30 minutes 3-5 times per day.  This will help to maintain full knee extension.  Quad Sets:  Sit or lie with your knee straight.  Tighten your quadriceps (front thigh) muscle.  Hold for 3 seconds, then relax.  Repeat 20 times per hour while awake.    PHYSICAL THERAPY:  Begin therapy 1 TO 3 DAYS AFTER SURGERY.  You were given a prescription for therapy at your preoperative office visit or on the day of surgery.  If you do not have physical therapy scheduled yet, please call our office for assistance.      FOLLOW-UP APPOINTMENT:  10-14 days with:    Dr. Padilla Amaya MD    [facility]  [address], [address]  [place] [address]    [facility] [address] [address]    Phone:   [phone]   "

## 2024-08-21 NOTE — ANESTHESIA PREPROCEDURE EVALUATION
Procedure:  Knee Arthroscopy, partial medial meniscectomy (Right: Knee)    Relevant Problems   ANESTHESIA (within normal limits)      CARDIO   (+) HTN (hypertension)      ENDO (within normal limits)      PULMONARY (within normal limits)        Physical Exam    Airway    Mallampati score: II  TM Distance: <3 FB  Neck ROM: full     Dental   Comment: Some missing, denies loose tooth.     Cardiovascular  Rhythm: regular, Rate: normal    Pulmonary   Breath sounds clear to auscultation    Other Findings        Anesthesia Plan  ASA Score- 2     Anesthesia Type- general with ASA Monitors.     Patient reason for not using neuraxial anesthesia or peripheral nerve block: adductor canal block..    Additional Monitors:     Airway Plan:            Plan Factors-Exercise tolerance (METS): >4 METS.    Chart reviewed. EKG reviewed.  Existing labs reviewed. Patient summary reviewed.    Patient is not a current smoker.              Induction- intravenous.    Postoperative Plan- Plan for postoperative opioid use. Planned trial extubation        Informed Consent- Anesthetic plan and risks discussed with patient.  I personally reviewed this patient with the CRNA. Discussed and agreed on the Anesthesia Plan with the CRNA..

## 2024-08-21 NOTE — OP NOTE
OPERATIVE REPORT  PATIENT NAME: Oumar Dow    :  1965  MRN: [id number]  Pt Location: [facility] OR ROOM 01    SURGERY DATE: 2024    Surgeons and Role:     * Kenneth Amaya MD - Primary     * Clari Colmenares PA-C - Assisting    The presence of Clari Colmenares PA-C was required for poisitioning, retraction, assistance, and closure. No qualified resident was available.      Preop Diagnosis:  Other tear of medial meniscus, current injury, right knee, initial encounter [S83.241A]    Post-Op Diagnosis Codes:     * Other tear of medial meniscus, current injury, right knee, initial encounter [S83.241A]    Procedure(s):  Right - Knee Arthroscopy, partial medial meniscectomy, synovectomy in all three compartments     Specimen(s):  * No specimens in log *    Estimated Blood Loss:   Minimal     Drains:  * No LDAs found *    Anesthesia Type:   General w/ Regional    Tourniquet time: 16 minutes    Operative Indications:  Patient is a 58-year-old male who sustained an injury to his right knee.  He was seen in the office and physical exam findings were consistent with a medial meniscus tear.  MRI was obtained which did confirm this diagnosis.  We did attempt a course of physical therapy and injections for nonoperative treatment.  Unfortunately he did not significantly improve with the symptoms.  He continues have medial joint line pain and intermittent swelling that was limiting his ability return to his normal activities.  At that point we discussed the possibility of a right knee arthroscopy with partial medial meniscectomy.  His blood pressure was poorly controlled during this conversation so we did have him follow-up with his primary care physician who did start a blood pressure medication to help to optimize him for surgery.  He then had a diagnosis of COVID-19 which did further delay possibility of operative intervention.  However he has recovered from that and we opted to proceed with surgery.  I recommended a  right knee arthroscopy with partial medial meniscectomy.  We had a detailed discussion of the risks, benefits, and alternatives to this procedure. The risks include but are not limited to infection, bleeding, stiffness, loss of range of motion, blood clot, failure of surgery, fracture, risk of potential future arthritis, swelling, injury to surrounding structures/nerve/artery/vein, failure of medical implants or surgical instruments, retained hardware and/or foreign body, and continued pain/dysfunction/disability. We discussed the expected timeline for recovery including the timeline for return to work and sporting activities. The patient expressed good understanding and elected to proceed.  Written consent was signed       Findings:      Arthroscopic evaluation of the right knee revealed the following:     Medial meniscus: Small tear of the intermargin of the meniscus in the white white zone at the junction of the posterior horn and body of the meniscus.  This was not repairable and was debrided  Medial femoral condyle: Grade 1 chondral changes  Medial tibial plateau: Grade 1 chondral changes  Anterior cruciate ligament: Normal appearance.  Posterior cruciate ligament: Normal appearance.   Lateral meniscus: Normal  Lateral femoral condyle: Grade 1 chondral changes  Lateral tibial plateau: Grade 1 chondral changes  Medial and lateral gutters: No loose bodies.   Patella: Grade 1 and grade 2 chondral changes  Trochlea: Grade 1 chondral changes  Synovium: There was extensive synovitis in the suprapatellar pouch anterior compartment medial and lateral compartments that was debrided       Procedure:  In the pre-operative holding area, the patient identified the correct operative extremity and I marked that extremity with my initials. The patient was then brought to the operating room and positioned supine. Following satisfactory induction of anesthesia, the right knee was prepped and draped in the usual sterile fashion  for surgical arthroscopy of the right knee. Before any surgical instrumentation was passed to me by the surgical technician, a formalized time-out occurred, which involves the surgeon, circulating nurse, and anesthesia staff all verifying the correct operative extremity. My initials were visible on the prepped and draped operative field.      The anatomic landmarks of the anteromedial and anterolateral portals were marked.  The limb was exsanguinated using an Esmarch bandage.  The tourniquet was inflated to 250 mmHg.  The anterolateral portal was established with a #11 blade.   the arthroscope was introduced through this portal. Under direct visualization, the anteromedial portal was established with a localizing needle followed by a scalpel. A probe was then introduced into the anteromedial portal. A systematic diagnostic arthroscopy evaluated the following:  medial compartment, notch, lateral compartment, patellofemoral compartment, medial gutter, and lateral gutter.      After diagnostic arthroscopy return to the medial compartment.  The medial meniscus tear was confined to the white white zone.  It was a radial tear.  I did not feel that this tear was repairable.  Using a combination of arthroscopic biter and shaver I did perform a partial medial meniscectomy debriding the tear back to a stable meniscal rim.  After debridement was completed I used an arthroscopic probe to evaluate the meniscus.  There is no additional areas of instability or tearing.     There was no additional pathology. All particulate debris was removed. The knee was copiously rinsed and then drained. The portals were closed with an interrupted 3-0 nylon suture. The skin was cleansed with sterile saline and dried before Steri-Strips were applied. Finally, a sterile dressing was secured by Webril and an Ace wrap.     The procedure was well-tolerated.  The patient emerged from anesthesia and was taken to the recovery room in stable condition.   There is no apparent complications.            SIGNATURE: Kenneth Amaya MD  DATE: August 21, 2024  TIME: 10:42 AM

## 2024-08-21 NOTE — PROGRESS NOTES
Patient reports 7/10 pain but denies need for any pain medication at this time. Reports that's his baseline.

## 2024-08-21 NOTE — H&P
H&P reviewed. After examining the patient I find no changes in the patients condition since the H&P had been written.    Vitals:    08/21/24 0846   BP: 154/86   Pulse: 72   Resp: 16   Temp: 97.8 °F (36.6 °C)   SpO2: 95%

## 2024-08-21 NOTE — PERIOPERATIVE NURSING NOTE
Patient reports pain 4/10 in his right knee. ACE wrap and REGI stockings to right lower extremity in place. Ice pack in place. Pedal pulses present. Discharge instructions reviewed verbally with patient and his significant other. All questions answered. Patient is aware of his follow up appointment and PT appointment and he is encouraged to keep them. Patient discharged via wheel chair with all of his belongings.

## 2024-08-21 NOTE — ANESTHESIA PROCEDURE NOTES
Peripheral Block    Patient location during procedure: holding area  Start time: 8/21/2024 9:25 AM  Reason for block: at surgeon's request and post-op pain management  Staffing  Performed by: Ventura Mojica MD  Authorized by: Ventura Mojica MD    Preanesthetic Checklist  Completed: patient identified, IV checked, site marked, risks and benefits discussed, surgical consent, monitors and equipment checked, pre-op evaluation and timeout performed  Peripheral Block  Prep: ChloraPrep  Patient monitoring: frequent blood pressure checks, continuous pulse oximetry and heart rate  Block type: Adductor Canal  Laterality: right  Injection technique: single-shot  Procedures: ultrasound guided, Ultrasound guidance required for the procedure to increase accuracy and safety of medication placement and decrease risk of complications.  Ultrasound permanent image saved  bupivacaine (PF) (MARCAINE) 0.5 % injection 20 mL - Perineural   10 mL - 8/21/2024 9:25:00 AM  bupivacaine liposomal (EXPAREL) 1.3 % injection 20 mL - Perineural   10 mL - 8/21/2024 9:25:00 AM  fentanyl citrate (PF) 100 MCG/2ML 50 mcg - Intravenous   50 mcg - 8/21/2024 9:23:00 AM  midazolam (VERSED) injection 0.5 mg - Intravenous   2 mg - 8/21/2024 9:22:00 AM  Needle  Needle type: Stimuplex   Needle gauge: 20 G  Needle length: 4 in  Needle localization: anatomical landmarks and ultrasound guidance  Needle insertion depth: 7 cm  Assessment  Injection assessment: incremental injection, frequent aspiration, injected with ease, negative aspiration, negative for heart rate change, no paresthesia on injection, no symptoms of intraneural/intravenous injection and needle tip visualized at all times  Paresthesia pain: none  Post-procedure:  site cleaned  patient tolerated the procedure well with no immediate complications

## 2024-08-21 NOTE — PROGRESS NOTES
Patient received from PACU RN. Alert and oriented sitting up on stretcher. Vitals WNL. Reports 5/10 pain, received medication in PACU; Will reassess. Refreshments given, family en route to bedside. Call bell within reach. Will continue to monitor. Kelley Castillo

## 2024-08-23 ENCOUNTER — OFFICE VISIT (OUTPATIENT)
Dept: PHYSICAL THERAPY | Facility: CLINIC | Age: 59
End: 2024-08-23
Payer: OTHER MISCELLANEOUS

## 2024-08-23 DIAGNOSIS — S83.241D TEAR OF MEDIAL MENISCUS OF RIGHT KNEE, CURRENT, UNSPECIFIED TEAR TYPE, SUBSEQUENT ENCOUNTER: Primary | ICD-10-CM

## 2024-08-23 PROCEDURE — 97530 THERAPEUTIC ACTIVITIES: CPT

## 2024-08-23 PROCEDURE — 97110 THERAPEUTIC EXERCISES: CPT

## 2024-08-23 PROCEDURE — 97116 GAIT TRAINING THERAPY: CPT

## 2024-08-23 NOTE — PROGRESS NOTES
Daily Note     Today's date: 2024  Patient name: Oumar Dow  : 1965  MRN: [id number]  Referring provider: Kenneth Amaya*  Dx:   Encounter Diagnosis     ICD-10-CM    1. Tear of medial meniscus of right knee, current, unspecified tear type, subsequent encounter  S83.241D                   Start Time: 1015  Stop Time: 1100  Total time in clinic (min): 45 minutes    Subjective: Pt reports feeling well today after his surgery. He reports numbness in his legs due to the nerve block.       Objective: See treatment diary below.     Dermatomes: Intact and symmetrical L1-S2 w/ residual numbness along L4-S2 dermatomes of R LE.     LE MMT  LEFT   RIGHT  -Hip Flexion:   3+/5 P!  3+/5 P!    -Knee Flexion  4-/5 P!  4-/5 P!  -Knee Extension 4-/5 P!  4-/5 P!    -Ankle DF  5/5  5/5  -Ankle PF  5/5  5/5    LE MMT (Re-eval)  LEFT   RIGHT  -Hip Flexion:   4-/5    4-/5    -Knee Flexion  4/5    3+/5  -Knee Extension 4/5    3/5 P!    -Ankle DF  5/5  5/5  -Ankle PF  5/5  5/5      Assessment: Pt re-evaluated today due to his post op status. Pt's neurovascular status is intact w/ myotomes and dermotomes of L1-S2 symmetrical and intact. Pt has residual numbness in L4-S2 dermatome but was still able to detect pressure. Educated pt on DVT/signs of infection and ambulating w/ cane. Reviewed exercises for pt to perform for HEP to maintain ROM and strength. Pt will continue to benefit from skilled PT to return him to his PLOF.       Plan: Continue per plan of care.      Insurance:  AMA/CMS Eval/ Re-eval POC expires FOTO Auth #/ Referral # Total units  Start date  Expiration date Extension  Visit limitation?  PT only or  PT+OT? Co-Insurance   AMA 24  No auth req                                                                         Date               Units:  Used               Authed:  Remaining                     Date               Units:  Used               Authed:  Remaining                      Date  "8/2/24 8/5/24 8/9/24 8/12/24 8/16/24 8/23/24   Visit Number 6 7 8 9 10 11   Manual         Tibiofemoral dist         STM                           Neuro Re-Ed         Bridges  3x15 3\"  3x10 3\" belt and 25 lb kb 3x10 3\" belt and 25 lb kb    Squat 2x10   2x10 rail 3x12 rail w/ medial glide    Clamshells  Black tb 2x15 5\"       Lateral step down  2x15 6\"       Lateral slides  15' 4 laps Black TB  15' 4laps Black TB      TKE 2x15 12.5 lb 2x15 14 lb  Ball on wall 3x15 3\" 3x12 14 lb 3x12 14 lb                                         TherEx         Anterior step down 6\" 2x12        LAQ   3x15 3\"      Repeated knee ext Seated 6x10 b/l Seated 6x10 b/l       Self STM          Step up 6\" 2x12        Lateral step up 6\" 2x12        Leg press    3x10 60 lb 3x10 75 lb    Bike/AROM   8' lvl 5 8' lvl 3 8'    Post op HEP      Reviewed: ankle pumps, seated marching, LAQ, hip ABD   TherAct         Patient education         Re-eval      Performed                              Gait Training         Gait      W/ SPC 45' 3 laps                     Modalities         CP               Precautions: HTN  Past Medical History:   Diagnosis Date    High blood pressure                "

## 2024-08-26 ENCOUNTER — OFFICE VISIT (OUTPATIENT)
Dept: PHYSICAL THERAPY | Facility: CLINIC | Age: 59
End: 2024-08-26
Payer: OTHER MISCELLANEOUS

## 2024-08-26 DIAGNOSIS — S83.241D TEAR OF MEDIAL MENISCUS OF RIGHT KNEE, CURRENT, UNSPECIFIED TEAR TYPE, SUBSEQUENT ENCOUNTER: Primary | ICD-10-CM

## 2024-08-26 PROCEDURE — 97116 GAIT TRAINING THERAPY: CPT

## 2024-08-26 PROCEDURE — 97112 NEUROMUSCULAR REEDUCATION: CPT

## 2024-08-26 PROCEDURE — 97110 THERAPEUTIC EXERCISES: CPT

## 2024-08-26 NOTE — PROGRESS NOTES
"Daily Note     Today's date: 2024  Patient name: Oumar Dow  : 1965  MRN: [id number]  Referring provider: Kenneth Amaya*  Dx:   Encounter Diagnosis     ICD-10-CM    1. Tear of medial meniscus of right knee, current, unspecified tear type, subsequent encounter  S83.241D           Start Time: 1330  Stop Time: 1415  Total time in clinic (min): 45 minutes    Subjective: Pt reports feeling okay. Presents w/ crutches today.       Objective: See treatment diary below      Assessment: Tolerated treatment well. Pt instructed on crutch usage. Pain was consistent throughout the session today. He had 5/10 pain at rest w/ 8/10 pain during exercises. Pt had 90 degrees of flexion w/ empty end feel. Continue progressing pt as he can tolerate regarding strength, function, and mobility.  Patient demonstrated fatigue post treatment, exhibited good technique with therapeutic exercises, and would benefit from continued PT      Plan: Continue per plan of care.      Insurance:  AMA/CMS Eval/ Re-eval POC expires FOTO Auth #/ Referral # Total units  Start date  Expiration date Extension  Visit limitation?  PT only or  PT+OT? Co-Insurance   AMA 24  No auth req                                                                         Date               Units:  Used               Authed:  Remaining                     Date               Units:  Used               Authed:  Remaining                      Date 24   Visit Number 7 8 9 10 11 12   Manual         Tibiofemoral dist         STM                           Neuro Re-Ed         Bridges 3x15 3\"  3x10 3\" belt and 25 lb kb 3x10 3\" belt and 25 lb kb     Squat   2x10 rail 3x12 rail w/ medial glide     Clamshells Black tb 2x15 5\"        Lateral step down 2x15 6\"        Lateral slides 15' 4 laps Black TB  15' 4laps Black TB       TKE 2x15 14 lb  Ball on wall 3x15 3\" 3x12 14 lb 3x12 14 lb      Quad set      3x10 5\" " "  SLR                           TherEx         Anterior step down         LAQ  3x15 3\"    3x10 3\"  SAQ 3x10   Repeated knee ext Seated 6x10 b/l        Self STM          Step up         Lateral step up         Leg press   3x10 60 lb 3x10 75 lb     Bike/AROM  8' lvl 5 8' lvl 3 8'  6\" lvl 4 NS            Post op HEP     Reviewed: ankle pumps, seated marching, LAQ, hip ABD    TherAct         Patient education         Re-eval     Performed                               Gait Training         Gait     W/ SPC 45' 3 laps SPC 45' 4 laps  Crutches reviewed                     Modalities         CP               Precautions: HTN  Past Medical History:   Diagnosis Date    High blood pressure                  "

## 2024-08-30 ENCOUNTER — OFFICE VISIT (OUTPATIENT)
Dept: PHYSICAL THERAPY | Facility: CLINIC | Age: 59
End: 2024-08-30
Payer: OTHER MISCELLANEOUS

## 2024-08-30 DIAGNOSIS — S83.241D TEAR OF MEDIAL MENISCUS OF RIGHT KNEE, CURRENT, UNSPECIFIED TEAR TYPE, SUBSEQUENT ENCOUNTER: Primary | ICD-10-CM

## 2024-08-30 PROCEDURE — 97112 NEUROMUSCULAR REEDUCATION: CPT

## 2024-08-30 PROCEDURE — 97110 THERAPEUTIC EXERCISES: CPT

## 2024-08-30 NOTE — PROGRESS NOTES
"Daily Note     Today's date: 2024  Patient name: Oumar Dow  : 1965  MRN: [id number]  Referring provider: Kenneth Amaya*  Dx:   Encounter Diagnosis     ICD-10-CM    1. Tear of medial meniscus of right knee, current, unspecified tear type, subsequent encounter  S83.241D           Start Time: 1145  Stop Time: 1230  Total time in clinic (min): 45 minutes    Subjective: Pt reports feeling okay. He still has soreness. Felt good in the days following the last PT session.       Objective: See treatment diary below      Assessment: Tolerated treatment well. Pt had no issues w/ exercises today. He had some pain during SLR and LAQ but this may be due to his post op status still. Tolerated TKE well w/o issues. Pt's pain was improved today compared to last session. Continue progressing pt as he can tolerate regarding strength, function, and mobility.  Patient demonstrated fatigue post treatment, exhibited good technique with therapeutic exercises, and would benefit from continued PT      Plan: Continue per plan of care.      Insurance:  AMA/CMS Eval/ Re-eval POC expires FOTO Auth #/ Referral # Total units  Start date  Expiration date Extension  Visit limitation?  PT only or  PT+OT? Co-Insurance   AMA 24  No auth req                                                                         Date               Units:  Used               Authed:  Remaining                     Date               Units:  Used               Authed:  Remaining                      Date 24   Visit Number 7 8 9 10 11 12 13   Manual          Tibiofemoral dist          STM                              Neuro Re-Ed          Bridges 3x15 3\"  3x10 3\" belt and 25 lb kb 3x10 3\" belt and 25 lb kb      Squat   2x10 rail 3x12 rail w/ medial glide      Clamshells Black tb 2x15 5\"         Lateral step down 2x15 6\"         Lateral slides 15' 4 laps Black TB  15' 4laps Black TB      " "  TKE 2x15 14 lb  Ball on wall 3x15 3\" 3x12 14 lb 3x12 14 lb    3x12 12.5 lb   Quad set      3x10 5\" 3x15 5\"   SLR       3x10                       TherEx          Anterior step down          LAQ  3x15 3\"    3x10 3\"  SAQ 3x10 3x15   Repeated knee ext Seated 6x10 b/l         Self STM           Step up          Lateral step up          Leg press   3x10 60 lb 3x10 75 lb      Bike/AROM  8' lvl 5 8' lvl 3 8'  6\" lvl 4 NS 8\" lvl 4 NS             Post op HEP     Reviewed: ankle pumps, seated marching, LAQ, hip ABD     TherAct          Patient education          Re-eval     Performed                                   Gait Training          Gait     W/ SPC 45' 3 laps SPC 45' 4 laps  Crutches reviewed                        Modalities          CP                Precautions: HTN  Past Medical History:   Diagnosis Date    High blood pressure                  "

## 2024-09-03 ENCOUNTER — OFFICE VISIT (OUTPATIENT)
Dept: PHYSICAL THERAPY | Facility: CLINIC | Age: 59
End: 2024-09-03
Payer: OTHER MISCELLANEOUS

## 2024-09-03 DIAGNOSIS — S83.241D TEAR OF MEDIAL MENISCUS OF RIGHT KNEE, CURRENT, UNSPECIFIED TEAR TYPE, SUBSEQUENT ENCOUNTER: Primary | ICD-10-CM

## 2024-09-03 PROCEDURE — 97112 NEUROMUSCULAR REEDUCATION: CPT

## 2024-09-03 PROCEDURE — 97110 THERAPEUTIC EXERCISES: CPT

## 2024-09-03 NOTE — PROGRESS NOTES
"Daily Note     Today's date: 9/3/2024  Patient name: Oumar Dow  : 1965  MRN: [id number]  Referring provider: Kenneth Amaya*  Dx:   Encounter Diagnosis     ICD-10-CM    1. Tear of medial meniscus of right knee, current, unspecified tear type, subsequent encounter  S83.241D           Start Time: 1545  Stop Time: 1625  Total time in clinic (min): 40 minutes    Subjective: Pt reports having a bad day and his knee is in 8/10 pain since the weekend. He reports trying to sit in the car for 30 min and that made his pain significantly worse.       Objective: See treatment diary below      Assessment: Tolerated treatment well. Pt's pain was unchanged today. Medial glide of TFJ helped relieve some of his pain but increased w/ more repetitions. Leg press also did not further irritate his knee but the pt did not feel confident controlling his knee as it felt like it was going buckle. Continue progressing pt as he can tolerate regarding strength, function, and mobility. Patient demonstrated fatigue post treatment, exhibited good technique with therapeutic exercises, and would benefit from continued PT      Plan: Continue per plan of care.      Insurance:  AMA/CMS Eval/ Re-eval POC expires FOTO Auth #/ Referral # Total units  Start date  Expiration date Extension  Visit limitation?  PT only or  PT+OT? Co-Insurance   AMA 24  No auth req                                                                         Date               Units:  Used               Authed:  Remaining                     Date               Units:  Used               Authed:  Remaining                      Date 8/12/24 8/16/24 8/23/24 8/26/24 8/30/24 9/3/24   Visit Number 9 10 11 12 13 14   Manual         Tibiofemoral dist         STM                           Neuro Re-Ed         Bridges 3x10 3\" belt and 25 lb kb 3x10 3\" belt and 25 lb kb       Squat 2x10 rail 3x12 rail w/ medial glide    3x10 rail w/ medial glide   Clamshells   " "      Lateral step down         Lateral slides         TKE 3x12 14 lb    3x12 12.5 lb    Quad set    3x10 5\" 3x15 5\"    SLR     3x10                      TherEx         Anterior step down         LAQ    3x10 3\"  SAQ 3x10 3x15    Repeated knee ext      4x10   Self STM          Step up         Lateral step up         Leg press 3x10 60 lb 3x10 75 lb    SL 25 lb 3x10   Bike/AROM 8' lvl 3 8'  6\" lvl 4 NS 8\" lvl 4 NS 8\" lvl 1 NS            Post op HEP   Reviewed: ankle pumps, seated marching, LAQ, hip ABD      TherAct         Patient education         Re-eval   Performed                                 Gait Training         Gait   W/ SPC 45' 3 laps SPC 45' 4 laps  Crutches reviewed                       Modalities         CP               Precautions: HTN  Past Medical History:   Diagnosis Date    High blood pressure                  "

## 2024-09-05 ENCOUNTER — OFFICE VISIT (OUTPATIENT)
Dept: OBGYN CLINIC | Facility: CLINIC | Age: 59
End: 2024-09-05

## 2024-09-05 VITALS
SYSTOLIC BLOOD PRESSURE: 170 MMHG | WEIGHT: 245 LBS | HEART RATE: 81 BPM | BODY MASS INDEX: 37.13 KG/M2 | HEIGHT: 68 IN | DIASTOLIC BLOOD PRESSURE: 109 MMHG

## 2024-09-05 DIAGNOSIS — S83.241D OTHER TEAR OF MEDIAL MENISCUS, CURRENT INJURY, RIGHT KNEE, SUBSEQUENT ENCOUNTER: ICD-10-CM

## 2024-09-05 DIAGNOSIS — Z98.890 S/P ARTHROSCOPIC PARTIAL MEDIAL MENISCECTOMY OF RIGHT KNEE: Primary | ICD-10-CM

## 2024-09-05 DIAGNOSIS — Z87.828 S/P ARTHROSCOPIC PARTIAL MEDIAL MENISCECTOMY OF RIGHT KNEE: Primary | ICD-10-CM

## 2024-09-05 DIAGNOSIS — Z47.89 AFTERCARE FOLLOWING SURGERY OF THE MUSCULOSKELETAL SYSTEM: ICD-10-CM

## 2024-09-05 PROCEDURE — 99024 POSTOP FOLLOW-UP VISIT: CPT | Performed by: ORTHOPAEDIC SURGERY

## 2024-09-05 NOTE — LETTER
September 5, 2024     Patient: Oumar Dow  YOB: 1965  Date of Visit: 9/5/2024      To Whom it May Concern:    Oumar Dow is under my professional care. Oumar was seen in my office on 9/5/2024. I anticipate Oumar will be cleared to return to full duty 6 weeks following surgery, 10/3/24. Updated restrictions will be provided at his follow up visit in 4 weeks.    If you have any questions or concerns, please don't hesitate to call.         Sincerely,          Kenneth Amaya MD        CC: No Recipients

## 2024-09-05 NOTE — PROGRESS NOTES
SUBJECTIVE:  Patient is a 58 y.o. year old male who presents for follow up now 2 weeks s/p Knee Arthroscopy, partial meniscectomy - Right performed on  8/21/2024.  Today, the patient notes soreness primarily localized to the medial aspect of the knee. He also reports quad weakness/fatigue, such that the leg shakes with PT exercises. Pain is relatively consistent, but overall, Oumar does report progress. He is attending PT consistently per protocol. He is taking Aspirin 81 mg BID for blood clot prevention and Tylenol/Naproxen as needed for pain control.      VITALS:  Vitals:    09/05/24 1043   BP: (!) 170/109   Pulse: 81       PHYSICAL EXAMINATION:  General: well developed and well nourished, alert, oriented times 3, and appears comfortable  Psychiatric: Normal    MUSCULOSKELETAL EXAMINATION:    Right Lower Extremity   Incision: Clean, dry, and intact. New Steri strips applied  No significant joint effusion  Range of Motion: 0-120 without pain  Medial joint line tenderness  Able to SLR. No extensor lag.  5/5 knee flexion/extension  No calf tenderness or swelling  +EHL/FHL/TA/GS  SILT throughout  Palpable DP pulse       PLAN:    We reviewed operative imaging and reviewed the procedure in detail. We discussed that it is reasonable for him to be experiencing soreness in the knee and quad weakness only 2 weeks out from surgery. I believe he is progressing appropriately so far. Continue physical therapy per provided protocol with focus on quad strengthening. Continue using Tylenol and Aleve as needed for pain control. Continue ASA 81 mg BID until 6 weeks post-op for DVT prophylaxis. We will follow up with Oumar in 4 weeks for 6-week post-op visit. He can follow up earlier for work clearance if he feels he progresses sooner. We provided a work note for anticipated return to work at the 6-week post-op marquis.

## 2024-09-06 ENCOUNTER — TELEPHONE (OUTPATIENT)
Dept: OBGYN CLINIC | Facility: HOSPITAL | Age: 59
End: 2024-09-06

## 2024-09-06 ENCOUNTER — OFFICE VISIT (OUTPATIENT)
Dept: PHYSICAL THERAPY | Facility: CLINIC | Age: 59
End: 2024-09-06
Payer: OTHER MISCELLANEOUS

## 2024-09-06 DIAGNOSIS — S83.241D TEAR OF MEDIAL MENISCUS OF RIGHT KNEE, CURRENT, UNSPECIFIED TEAR TYPE, SUBSEQUENT ENCOUNTER: Primary | ICD-10-CM

## 2024-09-06 PROCEDURE — 97112 NEUROMUSCULAR REEDUCATION: CPT

## 2024-09-06 PROCEDURE — 97110 THERAPEUTIC EXERCISES: CPT

## 2024-09-06 NOTE — PROGRESS NOTES
"Daily Note     Today's date: 2024  Patient name: Oumar Dow  : 1965  MRN: [id number]  Referring provider: Kenneth Amaya*  Dx:   Encounter Diagnosis     ICD-10-CM    1. Tear of medial meniscus of right knee, current, unspecified tear type, subsequent encounter  S83.241D                      Subjective: Pt reports feeling the same as last session.       Objective: See treatment diary below      Assessment: Tolerated treatment fair. Pt's pain continues to remain unchanged. He is still having difficulty w/ most ADLs and ambulation. Pain did not increase throughout session. Investigated his back to see if there was any LS involvement and that does not appear to be the case. Continue progressing pt as he can tolerate. Patient demonstrated fatigue post treatment, exhibited good technique with therapeutic exercises, and would benefit from continued PT      Plan: Continue per plan of care.      Insurance:  AMA/CMS Eval/ Re-eval POC expires FOTO Auth #/ Referral # Total units  Start date  Expiration date Extension  Visit limitation?  PT only or  PT+OT? Co-Insurance   AMA 24  No auth req                                                                         Date               Units:  Used               Authed:  Remaining                     Date               Units:  Used               Authed:  Remaining                      Date 8/23/24 8/26/24 8/30/24 9/3/24 9/6/24   Visit Number 11 12 13 14 15   Manual        Tibiofemoral dist        STM                        Neuro Re-Ed        Bridges        Squat    3x10 rail w/ medial glide    Clamshells        Lateral step down        Lateral slides        TKE   3x12 12.5 lb  3x15 12.5 lb   Quad set  3x10 5\" 3x15 5\"     SLR   3x10                     TherEx        Anterior step down        LAQ  3x10 3\"  SAQ 3x10 3x15  3x15   Repeated knee ext    4x10 3x10   Self STM         Step up        Lateral step up        Leg press    SL 25 lb 3x10 SL 45 lb " "3x10   Bike/AROM  6\" lvl 4 NS 8\" lvl 4 NS 8\" lvl 1 NS 6\" lvl lvl 1 NS   REIL     1x10  1x10    Post op HEP Reviewed: ankle pumps, seated marching, LAQ, hip ABD       TherAct        Patient education        Re-eval Performed                               Gait Training        Gait W/ SPC 45' 3 laps SPC 45' 4 laps  Crutches reviewed                      Modalities        CP              Precautions: HTN  Past Medical History:   Diagnosis Date    High blood pressure                  "

## 2024-09-06 NOTE — TELEPHONE ENCOUNTER
Caller: Palmira from [employer]      Doctor/Office: Laurie    CB#: [phone]      What needs to be faxed: OVN and work letter 9/6/24    ATTN to: claim #[id number]    Fax#: [phone]      Documents were successfully e-faxed

## 2024-09-09 ENCOUNTER — OFFICE VISIT (OUTPATIENT)
Dept: PHYSICAL THERAPY | Facility: CLINIC | Age: 59
End: 2024-09-09
Payer: OTHER MISCELLANEOUS

## 2024-09-09 DIAGNOSIS — S83.241D TEAR OF MEDIAL MENISCUS OF RIGHT KNEE, CURRENT, UNSPECIFIED TEAR TYPE, SUBSEQUENT ENCOUNTER: Primary | ICD-10-CM

## 2024-09-09 PROCEDURE — 97112 NEUROMUSCULAR REEDUCATION: CPT

## 2024-09-09 PROCEDURE — 97110 THERAPEUTIC EXERCISES: CPT

## 2024-09-09 NOTE — PROGRESS NOTES
Daily Note     Today's date: 2024  Patient name: Oumar Dow  : 1965  MRN: [id number]  Referring provider: Kenneth Amaya*  Dx:   Encounter Diagnosis     ICD-10-CM    1. Tear of medial meniscus of right knee, current, unspecified tear type, subsequent encounter  S83.241D           Start Time: 1100  Stop Time: 1145  Total time in clinic (min): 45 minutes    Subjective: Pt reports feeling the same as last session. Pt states his pain is unchanging and constant.       Objective: See treatment diary below      Assessment: Tolerated treatment fair. Pt continues to have significant pain throughout the session. There have been nothing that relieves his pain as of now. Pt still struggling w/ ambulating. Discussed trying his off loading brace to see if that will be enough to reduce his pain to improve his function. Discussed having an earlier follow up with his physician to potentially trial an injection or steroid dose pack given he is an appropriate candidate for either intervention. Tolerated session today w/ mildly decreased pain during function. Trialed medial glide during squats but pt was exquisitely tender to the touch so that was d/c'd for today. Pt continues to experience 7-8/10 pain. Continue progressing pt as he can tolerate. Patient demonstrated fatigue post treatment, exhibited good technique with therapeutic exercises, and would benefit from continued PT      Plan: Continue per plan of care.      Insurance:  AMA/CMS Eval/ Re-eval POC expires FOTO Auth #/ Referral # Total units  Start date  Expiration date Extension  Visit limitation?  PT only or  PT+OT? Co-Insurance   AMA 24  No auth req                                                                         Date               Units:  Used               Authed:  Remaining                     Date               Units:  Used               Authed:  Remaining                      Date 8/23/24 8/26/24 8/30/24 9/3/24 9/6/24 9/9/24  "  Visit Number 11 12 13 14 15 16   Manual         Tibiofemoral dist         STM                           Neuro Re-Ed         Bridges         Squat    3x10 rail w/ medial glide  3x10   Clamshells         Lateral step down         Lateral slides      45' 4 laps   TKE   3x12 12.5 lb  3x15 12.5 lb 3x15 12.5 lb   Quad set  3x10 5\" 3x15 5\"      SLR   3x10                        TherEx         Anterior step down         LAQ  3x10 3\"  SAQ 3x10 3x15  3x15 3x15   Repeated knee ext    4x10 3x10 3x10   Self STM          Step up         Lateral step up         Leg press    SL 25 lb 3x10 SL 45 lb 3x10 SL 35 lb 3x12   Bike/AROM  6\" lvl 4 NS 8\" lvl 4 NS 8\" lvl 1 NS 6\" lvl lvl 1 NS Bike 8\" lvl 1   REIL     1x10  1x10     Post op HEP Reviewed: ankle pumps, seated marching, LAQ, hip ABD        TherAct         Patient education         Re-eval Performed                                   Gait Training         Gait W/ SPC 45' 3 laps SPC 45' 4 laps  Crutches reviewed                         Modalities         CP               Precautions: HTN  Past Medical History:   Diagnosis Date    High blood pressure                  "

## 2024-09-13 ENCOUNTER — OFFICE VISIT (OUTPATIENT)
Dept: PHYSICAL THERAPY | Facility: CLINIC | Age: 59
End: 2024-09-13
Payer: OTHER MISCELLANEOUS

## 2024-09-13 DIAGNOSIS — S83.241D TEAR OF MEDIAL MENISCUS OF RIGHT KNEE, CURRENT, UNSPECIFIED TEAR TYPE, SUBSEQUENT ENCOUNTER: Primary | ICD-10-CM

## 2024-09-13 PROCEDURE — 97530 THERAPEUTIC ACTIVITIES: CPT

## 2024-09-13 PROCEDURE — 97112 NEUROMUSCULAR REEDUCATION: CPT

## 2024-09-13 PROCEDURE — 97110 THERAPEUTIC EXERCISES: CPT

## 2024-09-13 NOTE — PROGRESS NOTES
"Daily Note     Today's date: 2024  Patient name: Oumar Dow  : 1965  MRN: [id number]  Referring provider: Kenneth Amaya*  Dx:   Encounter Diagnosis     ICD-10-CM    1. Tear of medial meniscus of right knee, current, unspecified tear type, subsequent encounter  S83.241D           Start Time: 1100  Stop Time: 1145  Total time in clinic (min): 45 minutes    Subjective: Pt reports feeling the same. He noted that he had 4 hours of decreased pain on Tues so he is hopeful his pain will continue to decrease.       Objective: See treatment diary below      Assessment: Tolerated treatment fair. Discussed seeing his physician for a potential injection to reduce inflammation. His symptoms continued to remain stable throughout today's session w/ no major increase. Continue progressing pt as he can tolerate regarding symptom modulation. Patient demonstrated fatigue post treatment, exhibited good technique with therapeutic exercises, and would benefit from continued PT      Plan: Continue per plan of care.      Insurance:  AMA/CMS Eval/ Re-eval POC expires FOTO Auth #/ Referral # Total units  Start date  Expiration date Extension  Visit limitation?  PT only or  PT+OT? Co-Insurance   AMA 24  No auth req                                                                         Date               Units:  Used               Authed:  Remaining                     Date               Units:  Used               Authed:  Remaining                      Date 8/26/24 8/30/24 9/3/24 9/6/24 9/9/24 9/13/24   Visit Number 12 13 14 15 16 17   Manual         Tibiofemoral dist         STM                           Neuro Re-Ed         Bridges         Squat   3x10 rail w/ medial glide  3x10    Clamshells         Lateral step down         Lateral slides     45' 4 laps 45' 4 laps   TKE  3x12 12.5 lb  3x15 12.5 lb 3x15 12.5 lb 3x15 12.5 lb   Quad set 3x10 5\" 3x15 5\"       SLR  3x10                         TherEx       " "  Anterior step down         LAQ 3x10 3\"  SAQ 3x10 3x15  3x15 3x15 3x15 4 lbs   Repeated knee ext   4x10 3x10 3x10 W/ ER 3x10   Self STM          Step up         Lateral step up         Leg press   SL 25 lb 3x10 SL 45 lb 3x10 SL 35 lb 3x12    Bike/AROM 6\" lvl 4 NS 8\" lvl 4 NS 8\" lvl 1 NS 6\" lvl lvl 1 NS Bike 8\" lvl 1 Bike 8\" lvl 1   REIL    1x10  1x10      Post op HEP         TherAct         Patient education      Injection, consult w/ physician   Re-eval                                    Gait Training         Gait SPC 45' 4 laps  Crutches reviewed                          Modalities         CP               Precautions: HTN  Past Medical History:   Diagnosis Date    High blood pressure                  "

## 2024-09-16 ENCOUNTER — OFFICE VISIT (OUTPATIENT)
Dept: PHYSICAL THERAPY | Facility: CLINIC | Age: 59
End: 2024-09-16
Payer: OTHER MISCELLANEOUS

## 2024-09-16 DIAGNOSIS — S83.241D TEAR OF MEDIAL MENISCUS OF RIGHT KNEE, CURRENT, UNSPECIFIED TEAR TYPE, SUBSEQUENT ENCOUNTER: Primary | ICD-10-CM

## 2024-09-16 PROCEDURE — 97112 NEUROMUSCULAR REEDUCATION: CPT

## 2024-09-16 PROCEDURE — 97110 THERAPEUTIC EXERCISES: CPT

## 2024-09-16 NOTE — PROGRESS NOTES
Daily Note     Today's date: 2024  Patient name: Oumar Dow  : 1965  MRN: [id number]  Referring provider: Kenneth Amaya*  Dx:   Encounter Diagnosis     ICD-10-CM    1. Tear of medial meniscus of right knee, current, unspecified tear type, subsequent encounter  S83.241D           Start Time: 1100  Stop Time: 1145  Total time in clinic (min): 45 minutes    Subjective: Pt reports being in a lot of pain today. He is going over to see if he can schedule to see his surgeon sooner for follow up due to pain.       Objective: See treatment diary below      Assessment: Tolerated treatment fair. Pt continues to have significant pain throughout the session. No increases in pain but it remained consistent throughout session. Session was focused on symptom management and gentle motion today to improve symptom presentation. Continue progressing pt as he can tolerate regarding symptom modulation. Patient demonstrated fatigue post treatment, exhibited good technique with therapeutic exercises, and would benefit from continued PT      Plan: Continue per plan of care.      Insurance:  AMA/CMS Eval/ Re-eval POC expires FOTO Auth #/ Referral # Total units  Start date  Expiration date Extension  Visit limitation?  PT only or  PT+OT? Co-Insurance   AMA 24  No auth req                                                                         Date               Units:  Used               Authed:  Remaining                     Date               Units:  Used               Authed:  Remaining                      Date 9/3/24 9/6/24 9/9/24 9/13/24 9/16/24   Visit Number 14 15 16 17 18   Manual        Tibiofemoral dist        STM                        Neuro Re-Ed        Bridges     3x15   Squat 3x10 rail w/ medial glide  3x10     Clamshells        Lateral step down        Lateral slides   45' 4 laps 45' 4 laps 45' 4 laps   TKE  3x15 12.5 lb 3x15 12.5 lb 3x15 12.5 lb    Quad set        SLR                      "   TherEx        Anterior step down        LAQ  3x15 3x15 3x15 4 lbs 3x15 4 lbs   Repeated knee ext 4x10 3x10 3x10 W/ ER 3x10    Self STM         Step up        Lateral step up        Leg press SL 25 lb 3x10 SL 45 lb 3x10 SL 35 lb 3x12  SL 35 lb 3x12   Bike/AROM 8\" lvl 1 NS 6\" lvl lvl 1 NS Bike 8\" lvl 1 Bike 8\" lvl 1 Bike 8\" lvl 1   REIL  1x10  1x10       Post op HEP        TherAct        Patient education    Injection, consult w/ physician    Re-eval                                Gait Training        Gait                        Modalities        CP              Precautions: HTN  Past Medical History:   Diagnosis Date    High blood pressure                  "

## 2024-09-20 ENCOUNTER — OFFICE VISIT (OUTPATIENT)
Dept: PHYSICAL THERAPY | Facility: CLINIC | Age: 59
End: 2024-09-20
Payer: OTHER MISCELLANEOUS

## 2024-09-20 DIAGNOSIS — S83.241D TEAR OF MEDIAL MENISCUS OF RIGHT KNEE, CURRENT, UNSPECIFIED TEAR TYPE, SUBSEQUENT ENCOUNTER: Primary | ICD-10-CM

## 2024-09-20 PROCEDURE — 97110 THERAPEUTIC EXERCISES: CPT

## 2024-09-20 PROCEDURE — 97112 NEUROMUSCULAR REEDUCATION: CPT

## 2024-09-20 NOTE — PROGRESS NOTES
Daily Note     Today's date: 2024  Patient name: Oumar Dow  : 1965  MRN: [id number]  Referring provider: Kenneth Amaya*  Dx:   Encounter Diagnosis     ICD-10-CM    1. Tear of medial meniscus of right knee, current, unspecified tear type, subsequent encounter  S83.241D             Start Time: 1100  Stop Time: 1145  Total time in clinic (min): 45 minutes    Subjective: Pt reports being in a lot of pain earlier in the week but has since decreased. He reports 4/10 pain today.       Objective: See treatment diary below      Assessment: Tolerated treatment fair. Pt continues to have pain throughout the session. He had increases in pain w/ activity but his pain returned back to baseline of 4/10 w/ rest today. Pt remains hopeful that this means his knee is improving as he wants to return to work. Session was focused on symptom management and continuing to improve his function. Continue progressing pt as he can best tolerate. Patient demonstrated fatigue post treatment, exhibited good technique with therapeutic exercises, and would benefit from continued PT      Plan: Continue per plan of care.      Insurance:  AMA/CMS Eval/ Re-eval POC expires FOTO Auth #/ Referral # Total units  Start date  Expiration date Extension  Visit limitation?  PT only or  PT+OT? Co-Insurance   AMA 24  No auth req                                                                         Date               Units:  Used               Authed:  Remaining                     Date               Units:  Used               Authed:  Remaining                      Date 24   Visit Number 15 16 17 18 19   Manual        Tibiofemoral dist     WM   STM                        Neuro Re-Ed        Bridges    3x15 3x15 w/ belt   Squat  3x10      Clamshells        Lateral step down        Lateral slides  45' 4 laps 45' 4 laps 45' 4 laps 45' 5 laps   TKE 3x15 12.5 lb 3x15 12.5 lb 3x15 12.5 lb    "  Quad set        SLR                        TherEx        Anterior step down        LAQ 3x15 3x15 3x15 4 lbs 3x15 4 lbs 3x15 4 lbs   Repeated knee ext 3x10 3x10 W/ ER 3x10     Self STM         Step up        Lateral step up        Leg press SL 45 lb 3x10 SL 35 lb 3x12  SL 35 lb 3x12 SL 35 lb 3x12   Bike/AROM 6\" lvl lvl 1 NS Bike 8\" lvl 1 Bike 8\" lvl 1 Bike 8\" lvl 1 NS 8\"   REIL 1x10  1x10        Post op HEP        TherAct        Patient education   Injection, consult w/ physician     Re-eval                                Gait Training        Gait                        Modalities        CP              Precautions: HTN  Past Medical History:   Diagnosis Date    High blood pressure                  "

## 2024-09-23 ENCOUNTER — OFFICE VISIT (OUTPATIENT)
Dept: PHYSICAL THERAPY | Facility: CLINIC | Age: 59
End: 2024-09-23
Payer: OTHER MISCELLANEOUS

## 2024-09-23 DIAGNOSIS — S83.241D TEAR OF MEDIAL MENISCUS OF RIGHT KNEE, CURRENT, UNSPECIFIED TEAR TYPE, SUBSEQUENT ENCOUNTER: Primary | ICD-10-CM

## 2024-09-23 PROCEDURE — 97110 THERAPEUTIC EXERCISES: CPT

## 2024-09-23 PROCEDURE — 97112 NEUROMUSCULAR REEDUCATION: CPT

## 2024-09-23 NOTE — PROGRESS NOTES
Re-Eval Note     Today's date: 2024  Patient name: Oumar Dow  : 1965  MRN: [id number]  Referring provider: Kenneth Amaya*  Dx:   Encounter Diagnosis     ICD-10-CM    1. Tear of medial meniscus of right knee, current, unspecified tear type, subsequent encounter  S83.241D             Start Time: 1100  Stop Time: 1145  Total time in clinic (min): 45 minutes    Subjective: Pt reports being in a lot of pain earlier in the week but has since decreased. He reports 4/10 pain today.       Objective: See treatment diary below    Goals  Short Term Goals (1-3 weeks):    - Patient will be independent in basic HEP 2-3 weeks MET  - Patient will report >50% reduction in pain Progressing  - Patient will demonstrate >1/3 improvement in MMT grade as applicable Progressing  - Patient will be able to ambulate w/o greater than 4/10 pain w/ LRDN Progressing    Long Term Goals (6-8 weeks):  - Patient will be independent in a comprehensive home exercise program MET  - Patient FOTO score will improve to beyond goal score Progressing  - Patient will self-report >80% improvement in function Progressing 40%  - Patient will be able to return to ADLs w/o limitation Progressing  - Pt will be able to return to work w/o limitations Progressing    LE MMT  LEFT   RIGHT  -Hip Flexion:   3+/5 P!  3+/5 P!    -Knee Flexion  4-/5 P!  4-/5 P!  -Knee Extension 4-/5 P!  4-/5 P!    -Ankle DF  5/5  5/5  -Ankle PF  5/5  5/5    LE MMT (Re-eval)  LEFT   RIGHT  -Hip Flexion:   4-/5    4-/5    -Knee Flexion  4/5    3+/5  -Knee Extension 4/5    3/5 P!    -Ankle DF  5/5   5/5  -Ankle PF  5/5   5/5    LE MMT (Re-eval 24)  LEFT   RIGHT  -Hip Flexion:   4-/5    4-/5    -Knee Flexion  4/5    4-/5  -Knee Extension 4/5    3+/5 P!    -Ankle DF  5/5   5/5  -Ankle PF  5/5   5/5    Assessment: Pt re-evaluated today. He continues to present w/ significant pain that has improved since last week. He has typically been reporting 8/10 pain as his  baseline. Last week and today he has since reported 4/10 pain that has maintained over the weekend. He continues to make progress regarding his strength, function, pain level, and activity tolerance. He will continue to benefit from PT to continue improving on this as well as to monitor his symptoms as they continue evolving.     Tolerated treatment fair. Pt continues to have pain throughout the session. Trialed leg ext today. Pt reports 6/10 pain but this may be due to the amount of knee flexion he was placed into. Will decrease knee flexion NV. Pt's pain returned to 4/10 after resting and walking. Pt feels like his pain is taking a turn for the better as he has not had relief from his 8/10 for this long before. Continue progressing pt as he can best tolerate. Patient demonstrated fatigue post treatment, exhibited good technique with therapeutic exercises, and would benefit from continued PT      Plan: Continue per plan of care.      Insurance:  A/CMS Eval/ Re-eval POC expires FOTO Auth #/ Referral # Total units  Start date  Expiration date Extension  Visit limitation?  PT only or  PT+OT? Co-Insurance   AMA 6/27/24 8/22/24  No auth req                                                                         Date               Units:  Used               Authed:  Remaining                     Date               Units:  Used               Authed:  Remaining                      Date 9/6/24 9/9/24 9/13/24 9/16/24 9/20/24 9/23/24   Visit Number 15 16 17 18 19 20   Manual         Tibiofemoral dist     WM    STM                           Neuro Re-Ed         Bridges    3x15 3x15 w/ belt 3x15 w/ belt   Squat  3x10       Clamshells         Lateral step down         Lateral slides  45' 4 laps 45' 4 laps 45' 4 laps 45' 5 laps 45' 5 laps   TKE 3x15 12.5 lb 3x15 12.5 lb 3x15 12.5 lb      Quad set         SLR                           TherEx         Anterior step down         LAQ 3x15 3x15 3x15 4 lbs 3x15 4 lbs 3x15 4 lbs Leg  "ext 3x10 10 lbs   Repeated knee ext 3x10 3x10 W/ ER 3x10      Self STM          Step up         Lateral step up         Leg press SL 45 lb 3x10 SL 35 lb 3x12  SL 35 lb 3x12 SL 35 lb 3x12    Bike/AROM 6\" lvl lvl 1 NS Bike 8\" lvl 1 Bike 8\" lvl 1 Bike 8\" lvl 1 NS 8\" Bike 8\" lvl 1   REIL 1x10  1x10         Post op HEP         TherAct         Patient education   Injection, consult w/ physician      Re-eval                                    Gait Training         Gait                           Modalities         CP               Precautions: HTN  Past Medical History:   Diagnosis Date    High blood pressure                  "

## 2024-09-27 ENCOUNTER — APPOINTMENT (OUTPATIENT)
Dept: PHYSICAL THERAPY | Facility: CLINIC | Age: 59
End: 2024-09-27
Payer: OTHER MISCELLANEOUS

## 2024-09-30 ENCOUNTER — APPOINTMENT (OUTPATIENT)
Dept: PHYSICAL THERAPY | Facility: CLINIC | Age: 59
End: 2024-09-30
Payer: OTHER MISCELLANEOUS

## 2024-10-01 ENCOUNTER — OFFICE VISIT (OUTPATIENT)
Dept: PHYSICAL THERAPY | Facility: CLINIC | Age: 59
End: 2024-10-01
Payer: OTHER MISCELLANEOUS

## 2024-10-01 DIAGNOSIS — S83.241D TEAR OF MEDIAL MENISCUS OF RIGHT KNEE, CURRENT, UNSPECIFIED TEAR TYPE, SUBSEQUENT ENCOUNTER: Primary | ICD-10-CM

## 2024-10-01 PROCEDURE — 97110 THERAPEUTIC EXERCISES: CPT

## 2024-10-01 NOTE — PROGRESS NOTES
Re-Eval Note     Today's date: 10/1/2024  Patient name: Oumar Dow  : 1965  MRN: [id number]  Referring provider: Kenneth Amaya*  Dx:   Encounter Diagnosis     ICD-10-CM    1. Tear of medial meniscus of right knee, current, unspecified tear type, subsequent encounter  S83.241D             Start Time: 1015  Stop Time: 1100  Total time in clinic (min): 45 minutes    Subjective: Pt reports having a significant amount of pain over the weekend after riding in a car. States sitting for 15-30 min causes him tremendous amounts of pain.       Objective: See treatment diary below      Assessment: Pt tolerated treatment fair. Pt continues to have significant pain this session. Pain increased w/ exaggerated gait, bike, TKE, and leg ext. Leg press did not flare up his pain today. I believe that he is still progressing but his pain was severely exacerbated following his car ride down to [place]. Continue progressing pt as he can best tolerate. Monitor symptoms following his injection. Patient demonstrated fatigue post treatment, exhibited good technique with therapeutic exercises, and would benefit from continued PT      Plan: Continue per plan of care.      Insurance:  AMA/CMS Eval/ Re-eval POC expires FOTO Auth #/ Referral # Total units  Start date  Expiration date Extension  Visit limitation?  PT only or  PT+OT? Co-Insurance   AMA 24  No auth req           24                                                             Date               Units:  Used               Authed:  Remaining                     Date               Units:  Used               Authed:  Remaining                      Date 9/16/24 9/20/24 9/23/24 10/1/24   Visit Number 18 19 20 21   Manual       Tibiofemoral dist  WM     STM                     Neuro Re-Ed       Bridges 3x15 3x15 w/ belt 3x15 w/ belt    Squat       Clamshells       Lateral step down       Lateral slides 45' 4 laps 45' 5 laps 45' 5 laps    TKE    " 1x15 14 lb   Quad set       SLR                     TherEx       Anterior step down       LAQ 3x15 4 lbs 3x15 4 lbs Leg ext 3x10 10 lbs    Repeated knee ext       Self STM        Leg ext    Trialed 1x10   Step up       Lateral step up       Leg press SL 35 lb 3x12 SL 35 lb 3x12  35 lb 3x50   Bike/AROM Bike 8\" lvl 1 NS 8\" Bike 8\" lvl 1 Bike Lvl 1 8\"   REIL       Post op HEP       TherAct       Patient education       Re-eval                            Gait Training       Gait                     Modalities       CP             Precautions: HTN  Past Medical History:   Diagnosis Date    High blood pressure                  "

## 2024-10-03 ENCOUNTER — APPOINTMENT (OUTPATIENT)
Dept: RADIOLOGY | Facility: CLINIC | Age: 59
End: 2024-10-03
Payer: OTHER MISCELLANEOUS

## 2024-10-03 ENCOUNTER — OFFICE VISIT (OUTPATIENT)
Dept: OBGYN CLINIC | Facility: CLINIC | Age: 59
End: 2024-10-03
Payer: OTHER MISCELLANEOUS

## 2024-10-03 VITALS
DIASTOLIC BLOOD PRESSURE: 95 MMHG | HEIGHT: 68 IN | HEART RATE: 71 BPM | SYSTOLIC BLOOD PRESSURE: 151 MMHG | WEIGHT: 245 LBS | BODY MASS INDEX: 37.13 KG/M2

## 2024-10-03 DIAGNOSIS — Z87.828 S/P ARTHROSCOPIC PARTIAL MEDIAL MENISCECTOMY OF RIGHT KNEE: Primary | ICD-10-CM

## 2024-10-03 DIAGNOSIS — Z98.890 S/P ARTHROSCOPIC PARTIAL MEDIAL MENISCECTOMY OF RIGHT KNEE: Primary | ICD-10-CM

## 2024-10-03 DIAGNOSIS — Z87.828 S/P ARTHROSCOPIC PARTIAL MEDIAL MENISCECTOMY OF RIGHT KNEE: ICD-10-CM

## 2024-10-03 DIAGNOSIS — Z01.89 ENCOUNTER FOR LOWER EXTREMITY COMPARISON IMAGING STUDY: ICD-10-CM

## 2024-10-03 DIAGNOSIS — Z98.890 S/P ARTHROSCOPIC PARTIAL MEDIAL MENISCECTOMY OF RIGHT KNEE: ICD-10-CM

## 2024-10-03 PROCEDURE — 73562 X-RAY EXAM OF KNEE 3: CPT

## 2024-10-03 PROCEDURE — 99024 POSTOP FOLLOW-UP VISIT: CPT | Performed by: ORTHOPAEDIC SURGERY

## 2024-10-03 PROCEDURE — 73564 X-RAY EXAM KNEE 4 OR MORE: CPT

## 2024-10-03 PROCEDURE — 20610 DRAIN/INJ JOINT/BURSA W/O US: CPT | Performed by: ORTHOPAEDIC SURGERY

## 2024-10-03 RX ADMIN — BUPIVACAINE HYDROCHLORIDE 4 ML: 5 INJECTION, SOLUTION EPIDURAL; INTRACAUDAL at 10:00

## 2024-10-03 RX ADMIN — TRIAMCINOLONE ACETONIDE 40 MG: 40 INJECTION, SUSPENSION INTRA-ARTICULAR; INTRAMUSCULAR at 10:00

## 2024-10-03 NOTE — PROGRESS NOTES
"SUBJECTIVE:  Patient is a 58 y.o. year old male who presents for follow up now 6 weeks s/p Knee Arthroscopy, partial meniscectomy - Right performed on  8/21/2024.   He has been continuing to slowly improve.  His range of motion has improved.  He still has significant difficulty with stairs and deep flexion required during squatting.  At times he feels that he is walking with a limp and struggles with the pain.  For the most part this is started pain and improves after he has been moving around but still feels limited in his ability to perform work duties or ADLs.  Denies numbness or tingling.      Large joint arthrocentesis: R knee  [place] Protocol:  procedure performed by consultantConsent: Verbal consent obtained.  Risks and benefits: risks, benefits and alternatives were discussed  Consent given by: patient  Time out: Immediately prior to procedure a \"time out\" was called to verify the correct patient, procedure, equipment, support staff and site/side marked as required.  Patient identity confirmed: verbally with patient  Supporting Documentation  Indications: pain   Procedure Details  Location: knee - R knee  Needle size: 22 G  Ultrasound guidance: no  Approach: superior  Medications administered: 4 mL bupivacaine (PF) 0.5 %; 40 mg triamcinolone acetonide 40 mg/mL    Patient tolerance: patient tolerated the procedure well with no immediate complications  Dressing:  Sterile dressing applied            VITALS:  Vitals:    10/03/24 1102   BP: 151/95   Pulse: 71       PHYSICAL EXAMINATION:  General: well developed and well nourished, alert, oriented times 3, and appears comfortable  Psychiatric: Normal    MUSCULOSKELETAL EXAMINATION:    Right Lower Extremity   Incision: Well-healed  Range of Motion: 0-130  +EHL/FHL/TA/GS  SILT throughout  Palpable DP pulse      Plan:    Oumar continues to progress well overall but is still having difficulty navigating stairs and deep squatting.  I recommend he continue physical " therapy focusing on quad strengthening and progression to work specific activities.  He the pain he is still having is mostly started pain and does loosen up a bit.  He is not having any further locking episodes.  Given his persistent pain and dysfunction we did discuss additional options for pain relief.  He opted perform to proceed with a corticosteroid injection today which is safe now that we are over 6 weeks out from surgery.  Patient was well-tolerated with no apparent issues.  I provided a note for 4 weeks of limited duty while he continues to progress in therapy.  I explained that at that time even if he still having persistent symptoms we will plan to have him return to full duty.  I will see him back in 1 month for repeat evaluation.

## 2024-10-03 NOTE — LETTER
October 3, 2024     Patient: Oumar Dow  YOB: 1965  Date of Visit: 10/3/2024      To Whom it May Concern:    Oumar Dow is under my professional care. Oumar was seen in my office on 10/3/2024. Oumar  is cleared to return to work on limited duty at this time. He should not lift, push, or pull greater than 30 pounds and no climbing stairs. He should not work on his feet for greater than 2 hours at a time. He will be seen again in 4 weeks with anticipation for return to full duty.     If you have any questions or concerns, please don't hesitate to call.         Sincerely,          Kenneth Amaya MD

## 2024-10-04 ENCOUNTER — OFFICE VISIT (OUTPATIENT)
Dept: PHYSICAL THERAPY | Facility: CLINIC | Age: 59
End: 2024-10-04
Payer: OTHER MISCELLANEOUS

## 2024-10-04 ENCOUNTER — TELEPHONE (OUTPATIENT)
Age: 59
End: 2024-10-04

## 2024-10-04 DIAGNOSIS — S83.241D TEAR OF MEDIAL MENISCUS OF RIGHT KNEE, CURRENT, UNSPECIFIED TEAR TYPE, SUBSEQUENT ENCOUNTER: Primary | ICD-10-CM

## 2024-10-04 PROCEDURE — 97112 NEUROMUSCULAR REEDUCATION: CPT

## 2024-10-04 PROCEDURE — 97110 THERAPEUTIC EXERCISES: CPT

## 2024-10-04 NOTE — PROGRESS NOTES
Daily Note     Today's date: 10/4/2024  Patient name: Oumar Dow  : 1965  MRN: [id number]  Referring provider: Kenneth Amaya*  Dx:   Encounter Diagnosis     ICD-10-CM    1. Tear of medial meniscus of right knee, current, unspecified tear type, subsequent encounter  S83.241D                        Subjective: Pt reports feeling better following the cortisone shot he received. Pt feels his resting pain is decreasing.       Objective: See treatment diary below      Assessment: Pt tolerated treatment well. He had no issues on the bike. Able to tolerate squats again. Pt appears to have less pain during exercises today compared to last session. Symptoms still provoked w/ TKE potentially due to bending. Will assess force progression again NV. Continue progressing pt as he can best tolerate. Monitor symptoms following his injection. Patient demonstrated fatigue post treatment, exhibited good technique with therapeutic exercises, and would benefit from continued PT      Plan: Continue per plan of care.      Insurance:  AMA/CMS Eval/ Re-eval POC expires FOTO Auth #/ Referral # Total units  Start date  Expiration date Extension  Visit limitation?  PT only or  PT+OT? Co-Insurance   AMA 24  No auth req           24                                                             Date               Units:  Used               Authed:  Remaining                     Date               Units:  Used               Authed:  Remaining                      Date 9/16/24 9/20/24 9/23/24 10/1/24 10/4/24   Visit Number 18 19 20 21 22   Manual        Tibiofemoral dist  WM      STM                        Neuro Re-Ed        Bridges 3x15 3x15 w/ belt 3x15 w/ belt  3x15 w/ belt    Squat     3x15   Clamshells        Lateral step down        Lateral slides 45' 4 laps 45' 5 laps 45' 5 laps     TKE    1x15 14 lb 3x15 14 lb   Quad set        SLR                        TherEx        Anterior step down       "  LAQ 3x15 4 lbs 3x15 4 lbs Leg ext 3x10 10 lbs     Repeated knee ext        Self STM         Leg ext    Trialed 1x10    Step up        Lateral step up        Leg press SL 35 lb 3x12 SL 35 lb 3x12  35 lb 3x50 45 lb 3x50   Bike/AROM Bike 8\" lvl 1 NS 8\" Bike 8\" lvl 1 Bike Lvl 1 8\" Bike Lvl 1 8\"   REIL        Post op HEP        TherAct        Patient education        Re-eval                                Gait Training        Gait                        Modalities        CP              Precautions: HTN  Past Medical History:   Diagnosis Date    High blood pressure                  "

## 2024-10-04 NOTE — TELEPHONE ENCOUNTER
Caller: Yuri[employer]    Doctor: Jose Luis    Reason for call: Requested a copy of the 10/3 work note be faxed to Yakelin Reid @[phone].  When the office note is signed please fax to the same contact    Call back#: [phone] claim#[id number]

## 2024-10-07 ENCOUNTER — OFFICE VISIT (OUTPATIENT)
Dept: PHYSICAL THERAPY | Facility: CLINIC | Age: 59
End: 2024-10-07
Payer: OTHER MISCELLANEOUS

## 2024-10-07 DIAGNOSIS — S83.241D TEAR OF MEDIAL MENISCUS OF RIGHT KNEE, CURRENT, UNSPECIFIED TEAR TYPE, SUBSEQUENT ENCOUNTER: Primary | ICD-10-CM

## 2024-10-07 PROCEDURE — 97112 NEUROMUSCULAR REEDUCATION: CPT

## 2024-10-07 PROCEDURE — 97110 THERAPEUTIC EXERCISES: CPT

## 2024-10-07 RX ORDER — BUPIVACAINE HYDROCHLORIDE 5 MG/ML
4 INJECTION, SOLUTION EPIDURAL; INTRACAUDAL
Status: COMPLETED | OUTPATIENT
Start: 2024-10-03 | End: 2024-10-03

## 2024-10-07 RX ORDER — TRIAMCINOLONE ACETONIDE 40 MG/ML
40 INJECTION, SUSPENSION INTRA-ARTICULAR; INTRAMUSCULAR
Status: COMPLETED | OUTPATIENT
Start: 2024-10-03 | End: 2024-10-03

## 2024-10-07 NOTE — PROGRESS NOTES
Daily Note     Today's date: 10/7/2024  Patient name: Oumar Dow  : 1965  MRN: [id number]  Referring provider: Kenneth Amaya*  Dx:   Encounter Diagnosis     ICD-10-CM    1. Tear of medial meniscus of right knee, current, unspecified tear type, subsequent encounter  S83.241D             Start Time: 1015  Stop Time: 1100  Total time in clinic (min): 45 minutes    Subjective: Pt went back to work over the weekend and had difficulty w/ the increase in activity. Complains of mid back pain.      Objective: See treatment diary below      Assessment: Pt tolerated treatment well. Pt assessed for neural tension and lumbar involvement in his pain. Does not seem to have LS contribution to his pain. Nerve tensioners improved his symptoms. Added to HEP. Pt's complaints of mid back pain were resolved w/ thoracic mob. Continues to have no issues w/ strengthening exercises. Continue progressing pt as he can best tolerate. Monitor symptoms following his injection. Patient demonstrated fatigue post treatment, exhibited good technique with therapeutic exercises, and would benefit from continued PT      Plan: Continue per plan of care.      Insurance:  AMA/CMS Eval/ Re-eval POC expires FOTO Auth #/ Referral # Total units  Start date  Expiration date Extension  Visit limitation?  PT only or  PT+OT? Co-Insurance   AMA 24  No auth req           24                                                             Date               Units:  Used               Authed:  Remaining                     Date               Units:  Used               Authed:  Remaining                      Date 9/20/24 9/23/24 10/1/24 10/4/24 10/7/24   Visit Number 19 20 21 22 23   Manual        Tibiofemoral dist WM       STM        Thoracic mob     G5 WM T4-12           Neuro Re-Ed        Bridges 3x15 w/ belt 3x15 w/ belt  3x15 w/ belt  3x15 15 lb kb w/ belt   Squat    3x15    Clamshells        Lateral step down       "  Lateral slides 45' 5 laps 45' 5 laps      TKE   1x15 14 lb 3x15 14 lb    Quad set        SLR        Femoral nerve glide     Tensioner 4x10           TherEx        Anterior step down        LAQ 3x15 4 lbs Leg ext 3x10 10 lbs      Repeated knee ext     1x10 supine   1x10 IR  1x10 ER   Self STM         Leg ext   Trialed 1x10     Step up        Lateral step up        Leg press SL 35 lb 3x12  35 lb 3x50 45 lb 3x50    Bike/AROM NS 8\" Bike 8\" lvl 1 Bike Lvl 1 8\" Bike Lvl 1 8\" Bike lvl 1 8\"   REIL     1x10  1x10  no change   Thoracic ext     2x10   Post op HEP        TherAct        Patient education        Re-eval                                Gait Training        Gait                        Modalities        CP              Precautions: HTN  Past Medical History:   Diagnosis Date    High blood pressure                  "

## 2024-10-08 ENCOUNTER — TELEPHONE (OUTPATIENT)
Age: 59
End: 2024-10-08

## 2024-10-08 NOTE — TELEPHONE ENCOUNTER
Caller: Rasta Cutler    Doctor/Office: Jose Luis Herring CB#: [phone]      What needs to be faxed: AMINATA 10/3/2024    ATTN to: Rasta    Fax#: [phone]      Documents were successfully e-faxed

## 2024-10-14 ENCOUNTER — OFFICE VISIT (OUTPATIENT)
Dept: PHYSICAL THERAPY | Facility: CLINIC | Age: 59
End: 2024-10-14
Payer: OTHER MISCELLANEOUS

## 2024-10-14 DIAGNOSIS — S83.241D TEAR OF MEDIAL MENISCUS OF RIGHT KNEE, CURRENT, UNSPECIFIED TEAR TYPE, SUBSEQUENT ENCOUNTER: Primary | ICD-10-CM

## 2024-10-14 PROCEDURE — 97110 THERAPEUTIC EXERCISES: CPT

## 2024-10-14 PROCEDURE — 97112 NEUROMUSCULAR REEDUCATION: CPT

## 2024-10-14 NOTE — PROGRESS NOTES
"Daily Note     Today's date: 10/14/2024  Patient name: Oumar Dow  : 1965  MRN: [id number]  Referring provider: Kenneth Amaya*  Dx:   Encounter Diagnosis     ICD-10-CM    1. Tear of medial meniscus of right knee, current, unspecified tear type, subsequent encounter  S83.241D             Start Time: 1015  Stop Time: 1100  Total time in clinic (min): 45 minutes    Subjective: Pt returned to work and worked for 8 hours w/ some knee pain. Able to utilize HEP to manage his pain. Notes his pain increases when working but his pain will reduce w/ rest. Continues to have constant pain even when resting.       Objective: See treatment diary below      Assessment: Pt tolerated treatment well. Pt compliant w/ HEP which helps to reduce his pain. Pt had flare ups during step training for step ups and down. Will try 8\" step up NV. Pt demo's difficulty w/ motor control, strength, and endurance during step downs. No issues during squatting/leg press aside from fatigue. Continue progressing pt as he can best tolerate. Monitor symptoms following his injection. Patient demonstrated fatigue post treatment, exhibited good technique with therapeutic exercises, and would benefit from continued PT      Plan: Continue per plan of care.      Insurance:  AMA/CMS Eval/ Re-eval POC expires FOTO Auth #/ Referral # Total units  Start date  Expiration date Extension  Visit limitation?  PT only or  PT+OT? Co-Insurance   AMA 24  No auth req           24                                                             Date               Units:  Used               Authed:  Remaining                     Date               Units:  Used               Authed:  Remaining                      Date 9/20/24 9/23/24 10/1/24 10/4/24 10/7/24 10/14/24   Visit Number 19 20  22 23 24   Manual         Tibiofemoral dist WM        STM         Thoracic mob     G5 WM T4-12 G5 WM T4-12            Neuro Re-Ed         Bridges " "3x15 w/ belt 3x15 w/ belt  3x15 w/ belt  3x15 15 lb kb w/ belt 3x15 24 lb kb   Squat    3x15  3x15   Clamshells         Lateral step down         Lateral slides 45' 5 laps 45' 5 laps       TKE   1x15 14 lb 3x15 14 lb     Quad set         SLR         Femoral nerve glide     Tensioner 4x10             TherEx         Anterior step down      Trialed Step up and down  Do 8\" step up NV   LAQ 3x15 4 lbs Leg ext 3x10 10 lbs       Repeated knee ext     1x10 supine   1x10 IR  1x10 ER 2x10   Self STM          Leg ext   Trialed 1x10      Step up         Lateral step up         Leg press SL 35 lb 3x12  35 lb 3x50 45 lb 3x50  55 lb 3x50   Bike/AROM NS 8\" Bike 8\" lvl 1 Bike Lvl 1 8\" Bike Lvl 1 8\" Bike lvl 1 8\" Bike lvl 3 8\"   REIL     1x10  1x10  no change    Thoracic ext     2x10 2x15   Post op HEP         TherAct         Patient education         Re-eval                                    Gait Training         Gait                           Modalities         CP               Precautions: HTN  Past Medical History:   Diagnosis Date    High blood pressure                  "

## 2024-10-21 ENCOUNTER — OFFICE VISIT (OUTPATIENT)
Dept: PHYSICAL THERAPY | Facility: CLINIC | Age: 59
End: 2024-10-21
Payer: OTHER MISCELLANEOUS

## 2024-10-21 DIAGNOSIS — S83.241D TEAR OF MEDIAL MENISCUS OF RIGHT KNEE, CURRENT, UNSPECIFIED TEAR TYPE, SUBSEQUENT ENCOUNTER: Primary | ICD-10-CM

## 2024-10-21 PROCEDURE — 97110 THERAPEUTIC EXERCISES: CPT

## 2024-10-21 PROCEDURE — 97112 NEUROMUSCULAR REEDUCATION: CPT

## 2024-10-21 NOTE — PROGRESS NOTES
Daily Note     Today's date: 10/21/2024  Patient name: Oumar Dow  : 1965  MRN: [id number]  Referring provider: Kenneth Amaya*  Dx:   Encounter Diagnosis     ICD-10-CM    1. Tear of medial meniscus of right knee, current, unspecified tear type, subsequent encounter  S83.241D               Start Time: 1100  Stop Time: 1145  Total time in clinic (min): 45 minutes    Subjective: Pt reports having a flare up on Sat after working 8 hrs on Fri. He rested and his pain has decreased. He was able to work on Sun w/ decreased pain but he was also able to take more frequent breaks during. Notes his pain is the best it has been since surgery.       Objective: See treatment diary below      Assessment: Pt tolerated treatment well. Pt had no flare ups today. Pain stayed at approx. 1-2/10. Squatting improved w/ medial glide and decreased his pain. Pt continues to have some motor control, strength, and endurance deficits w/ exercises but is improving from session to session. Continue progressing as he can tolerate. Patient demonstrated fatigue post treatment, exhibited good technique with therapeutic exercises, and would benefit from continued PT      Plan: Continue per plan of care.      Insurance:  AMA/CMS Eval/ Re-eval POC expires FOTO Auth #/ Referral # Total units  Start date  Expiration date Extension  Visit limitation?  PT only or  PT+OT? Co-Insurance   AMA 24  No auth req           24                                                             Date               Units:  Used               Authed:  Remaining                     Date               Units:  Used               Authed:  Remaining                      Date 10/1/24 10/4/24 10/7/24 10/14/24 10/21/24   Visit Number 21 22 23 24 25   Manual        Tibiofemoral dist        STM        Thoracic mob   G5 WM T4-12 G5 WM T4-12 G5 WM T4-12           Neuro Re-Ed        Bridges  3x15 w/ belt  3x15 15 lb kb w/ belt 3x15 24 lb kb  "3x15 32 lb kb   Squat  3x15  3x15 3x15 w/ medial glide   Clamshells        Lateral step down        Lateral slides     15' 3 laps BTB   TKE 1x15 14 lb 3x15 14 lb   3x15 14 lb   Quad set        SLR        Femoral nerve glide   Tensioner 4x10             TherEx        Anterior step down    Trialed Step up and down  Do 8\" step up NV    LAQ        Repeated knee ext   1x10 supine   1x10 IR  1x10 ER 2x10    Self STM         Leg ext Trialed 1x10       Step up        Lateral step up        Leg press 35 lb 3x50 45 lb 3x50  55 lb 3x50 65 lb 3x25   Bike/AROM Bike Lvl 1 8\" Bike Lvl 1 8\" Bike lvl 1 8\" Bike lvl 3 8\" Bike lvl 5 8\"   REIL   1x10  1x10  no change     Thoracic ext   2x10 2x15    Post op HEP        TherAct        Patient education        Re-eval                                Gait Training        Gait                        Modalities        CP              Precautions: HTN  Past Medical History:   Diagnosis Date    High blood pressure                  "

## 2024-11-04 ENCOUNTER — OFFICE VISIT (OUTPATIENT)
Dept: PHYSICAL THERAPY | Facility: CLINIC | Age: 59
End: 2024-11-04
Payer: OTHER MISCELLANEOUS

## 2024-11-04 DIAGNOSIS — S83.241D TEAR OF MEDIAL MENISCUS OF RIGHT KNEE, CURRENT, UNSPECIFIED TEAR TYPE, SUBSEQUENT ENCOUNTER: Primary | ICD-10-CM

## 2024-11-04 PROCEDURE — 97112 NEUROMUSCULAR REEDUCATION: CPT

## 2024-11-04 PROCEDURE — 97110 THERAPEUTIC EXERCISES: CPT

## 2024-11-04 NOTE — PROGRESS NOTES
Daily Note     Today's date: 2024  Patient name: Oumar Dow  : 1965  MRN: [id number]  Referring provider: Kenneth Amaya*  Dx:   Encounter Diagnosis     ICD-10-CM    1. Tear of medial meniscus of right knee, current, unspecified tear type, subsequent encounter  S83.241D               Start Time: 1015  Stop Time: 1100  Total time in clinic (min): 45 minutes    Subjective: Pt reports having the same pain as noted previously. He also notes that he hurt both of his great toes over the weekend and has bruising on them after he dropped a walter stand on them as well as forcefully pulled his pants up as his toe got caught in his pant leg.       Objective: See treatment diary below      Assessment: Pt tolerated treatment well. Pt's pain continues to remain consistent since the injection. Usually his pain flares up and reduces w/ rest. No notable flare ups of pain today.  Squatting improved w/ medial glide and decreased his pain. Pt continues to have some motor control, strength, and endurance deficits w/ exercises but is improving from session to session. Continue progressing as he can tolerate. Patient demonstrated fatigue post treatment, exhibited good technique with therapeutic exercises, and would benefit from continued PT      Plan: Continue per plan of care.      Insurance:  AMA/CMS Eval/ Re-eval POC expires FOTO Auth #/ Referral # Total units  Start date  Expiration date Extension  Visit limitation?  PT only or  PT+OT? Co-Insurance   AMA 24  No auth req           24                                                             Date               Units:  Used               Authed:  Remaining                     Date               Units:  Used               Authed:  Remaining                      Date 10/1/24 10/4/24 10/7/24 10/14/24 10/21/24 11/4/24   Visit Number 21 22 23 24 25 26   Manual         Tibiofemoral dist         STM         Thoracic mob   G5 WM T4-12 G5 WM  "T4-12 G5 WM T4-12             Neuro Re-Ed         Bridges  3x15 w/ belt  3x15 15 lb kb w/ belt 3x15 24 lb kb 3x15 32 lb kb 3x15 w/ belt   Squat  3x15  3x15 3x15 w/ medial glide 3x15 w/ medial glide   Clamshells         Lateral step down         Lateral slides     15' 3 laps BTB    TKE 1x15 14 lb 3x15 14 lb   3x15 14 lb 3x15 14 lb   Quad set         SLR         Femoral nerve glide   Tensioner 4x10               TherEx         Anterior step down    Trialed Step up and down  Do 8\" step up NV     LAQ         Repeated knee ext   1x10 supine   1x10 IR  1x10 ER 2x10     Self STM          Leg ext Trialed 1x10        Step up         Lateral step up         Leg press 35 lb 3x50 45 lb 3x50  55 lb 3x50 65 lb 3x25 65 lb 3x50   Bike/AROM Bike Lvl 1 8\" Bike Lvl 1 8\" Bike lvl 1 8\" Bike lvl 3 8\" Bike lvl 5 8\" Bike lvl 5 8\"   REIL   1x10  1x10  no change      Thoracic ext   2x10 2x15     Post op HEP         TherAct         Patient education         Re-eval                                    Gait Training         Gait                           Modalities         CP               Precautions: HTN  Past Medical History:   Diagnosis Date    High blood pressure                  "

## 2024-11-08 ENCOUNTER — TELEPHONE (OUTPATIENT)
Age: 59
End: 2024-11-08

## 2024-11-08 NOTE — TELEPHONE ENCOUNTER
Caller: Yair at [facility]     Doctor: Dr Amaya    Reason for call: Yair is calling in from [employer]  wanting to know if the patient has any upcoming appointments scheduled with the

## 2024-11-11 ENCOUNTER — APPOINTMENT (OUTPATIENT)
Dept: PHYSICAL THERAPY | Facility: CLINIC | Age: 59
End: 2024-11-11
Payer: OTHER MISCELLANEOUS

## 2024-11-14 ENCOUNTER — OFFICE VISIT (OUTPATIENT)
Dept: PHYSICAL THERAPY | Facility: CLINIC | Age: 59
End: 2024-11-14
Payer: OTHER MISCELLANEOUS

## 2024-11-14 DIAGNOSIS — S83.241D TEAR OF MEDIAL MENISCUS OF RIGHT KNEE, CURRENT, UNSPECIFIED TEAR TYPE, SUBSEQUENT ENCOUNTER: Primary | ICD-10-CM

## 2024-11-14 PROCEDURE — 97530 THERAPEUTIC ACTIVITIES: CPT

## 2024-11-14 PROCEDURE — 97110 THERAPEUTIC EXERCISES: CPT

## 2024-11-14 PROCEDURE — 97112 NEUROMUSCULAR REEDUCATION: CPT

## 2024-11-14 NOTE — PROGRESS NOTES
Daily Note     Today's date: 2024  Patient name: Oumar Dow  : 1965  MRN: [id number]  Referring provider: Kenneth Amaya*  Dx:   Encounter Diagnosis     ICD-10-CM    1. Tear of medial meniscus of right knee, current, unspecified tear type, subsequent encounter  S83.241D               Start Time: 1415  Stop Time: 1500  Total time in clinic (min): 45 minutes    Subjective: Pt reports having significant pain after he turned at work and felt his knee pop and buckle. He is afraid he re-tore his meniscus. He reports 8-9/10 pain currently.       Objective: See treatment diary below    + bounce home, Maris test on R  Increased swelling on R w/ medial joint line pain     Goals  Short Term Goals (1-3 weeks): - Not met as of today due to acute symptoms  - Patient will be independent in basic HEP 2-3 weeks  - Patient will report >50% reduction in pain  - Patient will demonstrate >1/3 improvement in MMT grade as applicable  - Patient will be able to ambulate w/o greater than 4/10 pain w/ LRDN     Long Term Goals (6-8 weeks): - Not met as of today due to acute symptoms  - Patient will be independent in a comprehensive home exercise program  - Patient FOTO score will improve to beyond goal score  - Patient will self-report >80% improvement in function  - Patient will be able to return to ADLs w/o limitation  - Pt will be able to return to work w/o limitations    LE MMT  LEFT                           RIGHT  -Hip Flexion:                3+/5 P!             3+/5 P!     -Knee Flexion              4-/5 P!              4-/5 P!  -Knee Extension         4-/5 P!              4-/5 P!     -Ankle DF                    5/5                   5/5  -Ankle PF                    5/5                   5/5     LE MMT (Re-eval)  LEFT                           RIGHT  -Hip Flexion:                4-/5                              4-/5     -Knee Flexion              4/5                               3+/5  -Knee Extension          4/5                               3/5 P!     -Ankle DF                    5/5                   5/5  -Ankle PF                    5/5                   5/5    LE MMT (Re-eval 11/14/24)  LEFT                           RIGHT  -Hip Flexion:                4+/5                              4+/5     -Knee Flexion              4+/5                               4+/5  -Knee Extension         4/5                               3+/5 P!     -Ankle DF                    5/5                   5/5  -Ankle PF                    5/5                   5/5    Assessment: Pt re-evaluated today. Since his evaluation on 9/23/24, he has demo'd significant improvements in his pain levels, strength, and function. However, given his recent incident, he was unable to demo these improvements due to pain. He will be consulting w/ his surgeon regarding this recent incident as there is concern for a retear. Will continue to monitor to see if his symptoms change. Pt continues to require skilled PT due to his symptom presentation as well to continue working on the following deficits: strength, motor control, activity tolerance, and symptom modulation. Will extend his POC for another 6-8 weeks given his current condition.    Pt tolerated treatment fair. He had flare ups in pain following the NuStep. TFJ dist mildly helped his pain. Repeated knee ext also helped to reduce his symptoms. Table exercises did not cause any flare ups in pain. Pt was able to ambulate w/ decreased pain following the session. Continue progressing as he can tolerate. Patient demonstrated fatigue post treatment, exhibited good technique with therapeutic exercises, and would benefit from continued PT      Plan: Continue per plan of care.      Insurance:  AMA/CMS Eval/ Re-eval POC expires FOTO Auth #/ Referral # Total units  Start date  Expiration date Extension  Visit limitation?  PT only or  PT+OT? Co-Insurance   AMA 6/27/24 8/22/24  No auth req           9/23/24 11/18/24  "                                                            Date               Units:  Used               Authed:  Remaining                     Date               Units:  Used               Authed:  Remaining                      Date 10/7/24 10/14/24 10/21/24 11/4/24 11/14/24   Visit Number 23 24 25 26 27   Manual        Tibiofemoral dist     WM   STM        Thoracic mob G5 WM T4-12 G5 WM T4-12 G5 WM T4-12             Neuro Re-Ed        Bridges 3x15 15 lb kb w/ belt 3x15 24 lb kb 3x15 32 lb kb 3x15 w/ belt 3x15 w/ belt   Squat  3x15 3x15 w/ medial glide 3x15 w/ medial glide    Clamshells        Lateral step down        Lateral slides   15' 3 laps BTB     TKE   3x15 14 lb 3x15 14 lb    Quad set        SLR     3x10   Femoral nerve glide Tensioner 4x10               TherEx        Anterior step down  Trialed Step up and down  Do 8\" step up NV      LAQ     3x20   Repeated knee ext 1x10 supine   1x10 IR  1x10 ER 2x10      Self STM         Leg ext        Step up        Lateral step up        Leg press  55 lb 3x50 65 lb 3x25 65 lb 3x50    Bike/AROM Bike lvl 1 8\" Bike lvl 3 8\" Bike lvl 5 8\" Bike lvl 5 8\" NS lvl 2 8\"   REIL 1x10  1x10  no change       Thoracic ext 2x10 2x15      Post op HEP        TherAct        Patient education        Re-eval     Performed                           Gait Training        Gait                        Modalities        CP              Precautions: HTN  Past Medical History:   Diagnosis Date    High blood pressure                  "

## 2024-11-18 ENCOUNTER — OFFICE VISIT (OUTPATIENT)
Dept: PHYSICAL THERAPY | Facility: CLINIC | Age: 59
End: 2024-11-18
Payer: OTHER MISCELLANEOUS

## 2024-11-18 DIAGNOSIS — S83.241D TEAR OF MEDIAL MENISCUS OF RIGHT KNEE, CURRENT, UNSPECIFIED TEAR TYPE, SUBSEQUENT ENCOUNTER: Primary | ICD-10-CM

## 2024-11-18 PROCEDURE — 97110 THERAPEUTIC EXERCISES: CPT

## 2024-11-18 PROCEDURE — 97112 NEUROMUSCULAR REEDUCATION: CPT

## 2024-11-18 NOTE — PROGRESS NOTES
Daily Note     Today's date: 2024  Patient name: Oumar Dow  : 1965  MRN: [id number]  Referring provider: Kenneth Amaya*  Dx:   Encounter Diagnosis     ICD-10-CM    1. Tear of medial meniscus of right knee, current, unspecified tear type, subsequent encounter  S83.241D           Start Time: 1015  Stop Time: 1052  Total time in clinic (min): 37 minutes    Subjective: Patient report he has been experiencing increased knee pain over the past few days following twisting his knee at work. The patient reports he has been experiencing increased swelling and some bruising in his knee the past few days. Patient reports he used a heating pad on his knee last night        Objective: See treatment diary below      Assessment: Tolerated treatment well. Patient  reports pain with palpation along the medial and lateral knee joint line. Patient exhibited increased knee pain with al extension based movements during today's session. Held squats, lateral walking and LAQ due to patient's knee symptom irritability during today's session, attempt again when symptoms subside.Patient educated to rest his knee for the rest of today to allow his pain symptoms to decrease and to keep using repeated knee flexion and extension to maintain motion        Plan: Continue per plan of care.        Insurance:  AMA/CMS Eval/ Re-eval POC expires FOTO Auth #/ Referral # Total units  Start date  Expiration date Extension  Visit limitation?  PT only or  PT+OT? Co-Insurance   AMA 24  No auth req           24                                                             Date               Units:  Used               Authed:  Remaining                     Date               Units:  Used               Authed:  Remaining                      Date 10/7/24 10/14/24 10/21/24 11/4/24 11/14/24 11/18/24   Visit Number [id number] 27 28   Manual         Tibiofemoral dist     WM    STM         Thoracic mob G5 WM T4-12  "G5 WM T4-12 G5 WM T4-12               Neuro Re-Ed         Bridges 3x15 15 lb kb w/ belt 3x15 24 lb kb 3x15 32 lb kb 3x15 w/ belt 3x15 w/ belt 3x15 with belt    Squat  3x15 3x15 w/ medial glide 3x15 w/ medial glide     Clamshells         Lateral step down         Lateral slides   15' 3 laps BTB      TKE   3x15 14 lb 3x15 14 lb     Quad set         SLR     3x10 2x5    2x10 SAQ     Femoral nerve glide Tensioner 4x10                 TherEx         Anterior step down  Trialed Step up and down  Do 8\" step up NV       LAQ     3x20    Repeated knee ext 1x10 supine   1x10 IR  1x10 ER 2x10       Knee flex with peanut      30x 5s    Self STM          Leg ext         Step up         Lateral step up         Leg press  55 lb 3x50 65 lb 3x25 65 lb 3x50  65# 3x20   Bike/AROM Bike lvl 1 8\" Bike lvl 3 8\" Bike lvl 5 8\" Bike lvl 5 8\" NS lvl 2 8\" Bike  LVL 5  8\"   REIL 1x10  1x10  no change        Thoracic ext 2x10 2x15       Post op HEP         TherAct         Patient education         Re-eval     Performed                               Gait Training         Gait                           Modalities         CP               Precautions: HTN  Past Medical History:   Diagnosis Date    High blood pressure                    "

## 2024-11-22 NOTE — TELEPHONE ENCOUNTER
Caller: Veronica[facility]    Doctor: Jose Luis    Reason for call: Questioned if the patient had a f/u appt scheduled? Advised no    Call back#: [phone]

## 2024-11-25 ENCOUNTER — OFFICE VISIT (OUTPATIENT)
Dept: PHYSICAL THERAPY | Facility: CLINIC | Age: 59
End: 2024-11-25
Payer: OTHER MISCELLANEOUS

## 2024-11-25 DIAGNOSIS — S83.241D TEAR OF MEDIAL MENISCUS OF RIGHT KNEE, CURRENT, UNSPECIFIED TEAR TYPE, SUBSEQUENT ENCOUNTER: Primary | ICD-10-CM

## 2024-11-25 PROCEDURE — 97112 NEUROMUSCULAR REEDUCATION: CPT

## 2024-11-25 PROCEDURE — 97110 THERAPEUTIC EXERCISES: CPT

## 2024-11-25 NOTE — PROGRESS NOTES
Daily Note     Today's date: 2024  Patient name: Oumar Dow  : 1965  MRN: [id number]  Referring provider: Kenneth Amaya*  Dx:   Encounter Diagnosis     ICD-10-CM    1. Tear of medial meniscus of right knee, current, unspecified tear type, subsequent encounter  S83.241D           Start Time: 1015  Stop Time: 1045  Total time in clinic (min): 30 minutes    Subjective: Patient reports still having pain from his acute flare up but it has decreased.       Objective: See treatment diary below      Assessment: Tolerated treatment well. Patient had no flare ups today during session. Session abbreviated due to pt having another appointment following. Tolerated TKE well w/ no flare ups. Tidal tank carries were tolerated well w/ increases of pain toward the end but resolved w/ rest. Continue progressing pt as he can tolerate. Pt adequately fatigued following session.       Plan: Continue per plan of care.        Insurance:  AMA/CMS Eval/ Re-eval POC expires FOTO Auth #/ Referral # Total units  Start date  Expiration date Extension  Visit limitation?  PT only or  PT+OT? Co-Insurance   AMA 24  No auth req           24                                                Date               Units:  Used               Authed:  Remaining                     Date               Units:  Used               Authed:  Remaining                      Date 10/21/24 11/4/24 11/14/24 11/18/24 11/25/24   Visit Number [id number] 27 28 29   Manual        Tibiofemoral dist   WM     STM        Thoracic mob G5 WM T4-12               Neuro Re-Ed        Bridges 3x15 32 lb kb 3x15 w/ belt 3x15 w/ belt 3x15 with belt  3x15 w/ belt   Squat 3x15 w/ medial glide 3x15 w/ medial glide      Clamshells        Lateral step down        Lateral slides 15' 3 laps BTB       TKE 3x15 14 lb 3x15 14 lb   3x15 black band   Quad set        SLR   3x10 2x5    2x10 SAQ      Femoral nerve glide        Tidal tank  "carry     45' 3 laps 10 lb   TherEx        Anterior step down        LAQ   3x20     Repeated knee ext        Knee flex with peanut    30x 5s     Self STM         Leg ext     3x10 10\"   Step up        Lateral step up        Leg press 65 lb 3x25 65 lb 3x50  65# 3x20    Bike/AROM Bike lvl 5 8\" Bike lvl 5 8\" NS lvl 2 8\" Bike  LVL 5  8\" Bike lvl 1 6\"   REIL        Thoracic ext        Post op HEP        TherAct        Patient education        Re-eval   Performed                             Gait Training        Gait                        Modalities        CP              Precautions: HTN  Past Medical History:   Diagnosis Date    High blood pressure                    "

## (undated) DEVICE — U-DRAPE: Brand: CONVERTORS

## (undated) DEVICE — GLOVE INDICATOR PI UNDERGLOVE SZ 6.5 BLUE

## (undated) DEVICE — GLOVE INDICATOR PI UNDERGLOVE SZ 8 BLUE

## (undated) DEVICE — BANDAGE, ESMARK LF STR 6"X9' (20/CS): Brand: CYPRESS

## (undated) DEVICE — FABRIC REINFORCED, SURGICAL GOWN, XL: Brand: CONVERTORS

## (undated) DEVICE — SPONGE LAP 18 X 18 IN STRL RFD

## (undated) DEVICE — 3M™ STERI-DRAPE™ U-DRAPE 1015: Brand: STERI-DRAPE™

## (undated) DEVICE — BLADE SHAVER TORPEDO CRV 4MM 13MM COOLCUT

## (undated) DEVICE — 3M™ IOBAN™ 2 ANTIMICROBIAL INCISE DRAPE 6640EZ: Brand: IOBAN™ 2

## (undated) DEVICE — LIGHT GLOVE GREEN

## (undated) DEVICE — NON-STERILE REUSABLE TOURNIQUET CUFF SINGLE BLADDER, DUAL PORT AND QUICK CONNECT CONNECTOR: Brand: COLOR CUFF

## (undated) DEVICE — INTENDED FOR TISSUE SEPARATION, AND OTHER PROCEDURES THAT REQUIRE A SHARP SURGICAL BLADE TO PUNCTURE OR CUT.: Brand: BARD-PARKER ® CARBON RIB-BACK BLADES

## (undated) DEVICE — CHLORAPREP HI-LITE 26ML ORANGE

## (undated) DEVICE — PACK ARTHROSCOPY

## (undated) DEVICE — TOWEL SURG XR DETECT GREEN STRL RFD

## (undated) DEVICE — TUBING ARTHROSCOPIC WAVE  MAIN PUMP

## (undated) DEVICE — DUAL SPIKE ADAPTER: Brand: CONMED

## (undated) DEVICE — ABDOMINAL PAD: Brand: DERMACEA

## (undated) DEVICE — ASTOUND SURGICAL GOWN, XXX LARGE, X-LONG: Brand: CONVERTORS

## (undated) DEVICE — TIBURON EXTREMITY SHEET: Brand: CONVERTORS

## (undated) DEVICE — CAST PADDING 4 IN SYNTHETIC NON-STRL

## (undated) DEVICE — GLOVE SRG BIOGEL 6.5

## (undated) DEVICE — PROBE ABLATION  APOLLO RF 50 DEG MULTI PORT

## (undated) DEVICE — ARTHROSCOPY FLOOR MAT

## (undated) DEVICE — ACE WRAP 6 IN XL STERILE

## (undated) DEVICE — CURITY NON-ADHERENT STRIPS: Brand: CURITY

## (undated) DEVICE — GLOVE SRG BIOGEL 8

## (undated) DEVICE — STRAP LITHOTOMY CANDY CANE

## (undated) DEVICE — 1016 S-DRAPE IRRIG POUCH 10/BOX: Brand: STERI-DRAPE™